# Patient Record
Sex: FEMALE | Race: WHITE | Employment: UNEMPLOYED | URBAN - METROPOLITAN AREA
[De-identification: names, ages, dates, MRNs, and addresses within clinical notes are randomized per-mention and may not be internally consistent; named-entity substitution may affect disease eponyms.]

---

## 2018-01-24 ENCOUNTER — HOSPITAL ENCOUNTER (OUTPATIENT)
Dept: GENERAL RADIOLOGY | Age: 63
Discharge: HOME | End: 2018-01-24
Attending: FAMILY MEDICINE
Payer: COMMERCIAL

## 2018-01-24 DIAGNOSIS — R05.3 CHRONIC COUGH: ICD-10-CM

## 2018-01-24 PROCEDURE — 71046 X-RAY EXAM CHEST 2 VIEWS: CPT

## 2018-01-24 NOTE — PROGRESS NOTES
Xray - no fluid (effusion), just has mild scarring as previous  Please call with results - she does not currently have access to [employer]

## 2018-01-31 ENCOUNTER — HOSPITAL ENCOUNTER (OUTPATIENT)
Dept: ULTRASOUND IMAGING | Age: 63
Discharge: HOME | End: 2018-01-31
Attending: FAMILY MEDICINE
Payer: COMMERCIAL

## 2018-01-31 DIAGNOSIS — M25.461 SWELLING OF BOTH KNEES: ICD-10-CM

## 2018-01-31 DIAGNOSIS — M25.462 SWELLING OF BOTH KNEES: ICD-10-CM

## 2018-01-31 PROCEDURE — 76882 US LMTD JT/FCL EVL NVASC XTR: CPT

## 2018-01-31 NOTE — PROGRESS NOTES
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 Paula Almanzar  Results released to [address] [place] [address] [address] with comments

## 2018-01-31 NOTE — PROGRESS NOTES
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 Matthew Thakkar  Results released to [address] [place] [address] [address] with comments

## 2018-03-08 ENCOUNTER — HOSPITAL ENCOUNTER (OUTPATIENT)
Dept: ULTRASOUND IMAGING | Age: 63
Discharge: HOME | End: 2018-03-08
Attending: FAMILY MEDICINE
Payer: COMMERCIAL

## 2018-03-08 DIAGNOSIS — R11.0 NAUSEA: ICD-10-CM

## 2018-03-08 PROCEDURE — 76700 US EXAM ABDOM COMPLETE: CPT

## 2018-03-08 NOTE — PROGRESS NOTES
Patient notifed of results.  She said she is going to try eating better and will call if she doesn't feel better

## 2018-03-08 NOTE — PROGRESS NOTES
Ultrasound ok  Call with lab results as well - patient does not appear to check mychart  Next step for evaluation would be referral to GI.   Let me know if she wishes to proceed

## 2018-03-28 PROBLEM — R26.9 ABNORMALITY OF GAIT AS LATE EFFECT OF STROKE: Status: ACTIVE | Noted: 2018-03-28

## 2018-03-28 PROBLEM — I69.398 ABNORMALITY OF GAIT AS LATE EFFECT OF STROKE: Status: ACTIVE | Noted: 2018-03-28

## 2018-11-26 ENCOUNTER — HOSPITAL ENCOUNTER (EMERGENCY)
Age: 63
Discharge: HOME | End: 2018-11-27
Attending: EMERGENCY MEDICINE
Payer: COMMERCIAL

## 2018-11-26 ENCOUNTER — APPOINTMENT (OUTPATIENT)
Dept: GENERAL RADIOLOGY | Age: 63
End: 2018-11-26
Attending: EMERGENCY MEDICINE
Payer: COMMERCIAL

## 2018-11-26 DIAGNOSIS — S52.532A CLOSED COLLES' FRACTURE OF LEFT RADIUS, INITIAL ENCOUNTER: Primary | ICD-10-CM

## 2018-11-26 PROCEDURE — 74011250637 HC RX REV CODE- 250/637: Performed by: EMERGENCY MEDICINE

## 2018-11-26 PROCEDURE — 73110 X-RAY EXAM OF WRIST: CPT

## 2018-11-26 PROCEDURE — 99283 EMERGENCY DEPT VISIT LOW MDM: CPT | Performed by: EMERGENCY MEDICINE

## 2018-11-26 RX ORDER — HYDROCODONE BITARTRATE AND ACETAMINOPHEN 7.5; 325 MG/1; MG/1
1 TABLET ORAL
Status: COMPLETED | OUTPATIENT
Start: 2018-11-26 | End: 2018-11-26

## 2018-11-26 RX ADMIN — HYDROCODONE BITARTRATE AND ACETAMINOPHEN 1 TABLET: 7.5; 325 TABLET ORAL at 23:46

## 2018-11-27 VITALS
DIASTOLIC BLOOD PRESSURE: 64 MMHG | WEIGHT: 133 LBS | RESPIRATION RATE: 20 BRPM | HEIGHT: 62 IN | HEART RATE: 95 BPM | TEMPERATURE: 98 F | SYSTOLIC BLOOD PRESSURE: 145 MMHG | BODY MASS INDEX: 24.48 KG/M2 | OXYGEN SATURATION: 98 %

## 2018-11-27 RX ORDER — HYDROCODONE BITARTRATE AND ACETAMINOPHEN 7.5; 325 MG/1; MG/1
1 TABLET ORAL
Qty: 17 TAB | Refills: 0 | Status: SHIPPED | OUTPATIENT
Start: 2018-11-27 | End: 2018-11-28

## 2018-11-27 NOTE — ED NOTES
I have reviewed discharge instructions with the patient. The patient verbalized understanding. Patient left ED via Discharge Method: ambulatory to Home with spouse. Opportunity for questions and clarification provided. Patient given 1 scripts. To continue your aftercare when you leave the hospital, you may receive an automated call from our care team to check in on how you are doing. This is a free service and part of our promise to provide the best care and service to meet your aftercare needs.  If you have questions, or wish to unsubscribe from this service please call [phone]. Thank you for Choosing our [facility] Emergency Department.

## 2018-11-27 NOTE — ED PROVIDER NOTES
Patient presents to ER after having a fall. Reports she fell from a standing position on her outstretched left hand and wrist.  Denies any head trauma or loss of consciousness. Reports pain to her left wrist and forearm. Denies any other injuries The history is provided by the patient. Wrist Pain This is a new problem. The current episode started 1 to 2 hours ago. The problem has not changed since onset. The pain is present in the left wrist and left arm. The quality of the pain is described as aching. The pain is at a severity of 4/10. The pain is mild. Associated symptoms include limited range of motion. Pertinent negatives include no numbness, no stiffness, no tingling and no back pain. She has tried nothing for the symptoms. Past Medical History:  
Diagnosis Date  Acromioclavicular joint arthritis   
 right  Anxiety Fluoxetine at 40mg made her feel like throat was closing up; no problems at 20mg but not effective  Autoimmune disease ([address]  Hypercholesteremia  Hypothyroidism  Insomnia  Lupus  Rheumatoid arthritis ([address]  RLS (restless legs syndrome)  Stroke [facility]) 2007  
 right cerebellar - residual balance problems and memory  Usual interstitial pneumonitis ([address] Past Surgical History:  
Procedure Laterality Date  HX BREAST AUGMENTATION  Feb 2002  HX BREAST AUGMENTATION  11/2016  
 revision  HX CARPAL TUNNEL RELEASE Bilateral   
 HX COLONOSCOPY  05/2015  
 normal - 5 year due to family  history  HX HEENT Left   
 graft eardrum x2 [facility] HEENT  Oct 2013  
 left ear repair - fracture bone  HX HEENT Left 5-21-15  
 ear reconstruction [address]  HX MOHS PROCEDURES Right   
 w/bicep tendon repair  HX ORTHOPAEDIC    
 trigger fingers [facility] ORTHOPAEDIC  Sept 2012  
 right shoulder and left wrist  
 HX ORTHOPAEDIC Right April 2015  
 wrist tendon repair  HX TONSILLECTOMY [facility] Family History:  
Problem Relation Age of Onset  Cancer Father  Heart Disease Mother Social History Socioeconomic History  Marital status:  Spouse name: Not on file  Number of children: Not on file  Years of education: Not on file  Highest education level: Not on file Social Needs  Financial resource strain: Not on file  Food insecurity - worry: Not on file  Food insecurity - inability: Not on file  Transportation needs - medical: Not on file  Transportation needs - non-medical: Not on file Occupational History  Not on file Tobacco Use  Smoking status: Former Smoker Packs/day: 1.50 Years: 9.00 Pack years: 13.50 Last attempt to quit: 1975 Years since quittin.9  Smokeless tobacco: Never Used Substance and Sexual Activity  Alcohol use: Yes Alcohol/week: 0.0 oz  
  Comment: social  
 Drug use: No  
 Sexual activity: Not on file Other Topics Concern  Not on file Social History Narrative  Not on file ALLERGIES: Codeine Review of Systems Constitutional: Negative for diaphoresis, fatigue and unexpected weight change. HENT: Negative for congestion and dental problem. Eyes: Negative for photophobia, redness and visual disturbance. Respiratory: Negative for choking and chest tightness. Cardiovascular: Negative for palpitations and leg swelling. Gastrointestinal: Negative for abdominal pain and anal bleeding. Endocrine: Negative for polyphagia. Genitourinary: Negative for flank pain, frequency and urgency. Musculoskeletal: Negative for back pain, gait problem and stiffness. Skin: Negative for pallor and rash. Neurological: Negative for tingling, syncope, speech difficulty and numbness. Hematological: Negative for adenopathy. Does not bruise/bleed easily. Psychiatric/Behavioral: Negative for behavioral problems and confusion. All other systems reviewed and are negative. Vitals:  
 11/26/18 2149 BP: 145/64 Pulse: 98 Resp: 20 Temp: 98 °F (36.7 °C) SpO2: 98% Weight: 60.3 kg (133 lb) Height: 5' 2\" (1.575 m) Physical Exam  
Constitutional: She appears well-developed and well-nourished. HENT:  
Head: Normocephalic and atraumatic. Cardiovascular: Normal rate. Pulmonary/Chest: Effort normal and breath sounds normal.  
Musculoskeletal:  
     Left shoulder: She exhibits normal range of motion and no tenderness. Left elbow: She exhibits normal range of motion and no swelling. Left wrist: She exhibits decreased range of motion, tenderness and bony tenderness. Neurological: She is alert. Nursing note and vitals reviewed. MDM Number of Diagnoses or Management Options Diagnosis management comments: We'll obtain x-ray of left wrist and forearm. Patient is neurovascularly intact distally 11:38 PM 
X-ray shows mildly displaced distal Colles' fracture and nondisplaced ulnar styloid Fracture Will place patient in a sugar tong, give sling, encouraged close follow-up with orthopedics Amount and/or Complexity of Data Reviewed Tests in the radiology section of CPT®: ordered and reviewed Risk of Complications, Morbidity, and/or Mortality Presenting problems: low Diagnostic procedures: low Management options: low Patient Progress Patient progress: stable Procedures

## 2018-11-27 NOTE — DISCHARGE INSTRUCTIONS
Take medications as prescribed  Keep arm elevated  Call and arrange follow-up with orthopedics  Returns to the ER for any new worsening symptoms    Broken Wrist: Care Instructions  Your Care Instructions    Your wrist can break, or fracture, during sports, a fall, or other accidents. The break may happen when your wrist is hit or is used to protect you in a fall. Fractures can range from a small, hairline crack, to a bone or bones broken into two or more pieces. Your treatment depends on how bad the break is. Your doctor may have put your wrist in a cast or splint. This will help keep your wrist stable until your follow-up appointment. It may take weeks or months for your wrist to heal. You can help it heal with care at home. You heal best when you take good care of yourself. Eat a variety of healthy foods, and don't smoke. Follow-up care is a key part of your treatment and safety. Be sure to make and go to all appointments, and call your doctor if you are having problems. It's also a good idea to know your test results and keep a list of the medicines you take. How can you care for yourself at home? · Put ice or a cold pack on your wrist for 10 to 20 minutes at a time. Try to do this every 1 to 2 hours for the next 3 days (when you are awake). Put a thin cloth between the ice and your cast or splint. Keep your cast or splint dry. · Follow the splint or cast care instructions your doctor gives you. If you have a splint, do not take it off unless your doctor tells you to. Be careful not to put the splint on too tight. · Be safe with medicines. Take pain medicines exactly as directed. ? If the doctor gave you a prescription medicine for pain, take it as prescribed. ? If you are not taking a prescription pain medicine, ask your doctor if you can take an over-the-counter medicine. · Prop up your wrist on pillows when you sit or lie down in the first few days after the injury.  Keep your wrist higher than the level of your heart. This will help reduce swelling. · Move your fingers often to reduce swelling and stiffness, but do not use that hand to grab or carry anything. · Follow instructions for exercises to keep your arm strong. When should you call for help? Call your doctor now or seek immediate medical care if:    · You have new or worse pain.     · Your hand or fingers are cool or pale or change color.     · Your cast or splint feels too tight.     · You have tingling, weakness, or numbness in your hand or fingers.    Watch closely for changes in your health, and be sure to contact your doctor if:    · You do not get better as expected.     · You have problems with your cast or splint. Where can you learn more? Go to http://narayan-alessandro.info/. Enter [id number] in the search box to learn more about \"Broken Wrist: Care Instructions. \"  Current as of: November 29, 2017  Content Version: 11.8  © 7784-8972 [employer]. Care instructions adapted under license by [employer] (which disclaims liability or warranty for this information). If you have questions about a medical condition or this instruction, always ask your healthcare professional. [address] any warranty or liability for your use of this information.

## 2018-11-30 RX ORDER — SODIUM CHLORIDE 0.9 % (FLUSH) 0.9 %
5-10 SYRINGE (ML) INJECTION EVERY 8 HOURS
Status: CANCELLED | OUTPATIENT
Start: 2018-11-30

## 2018-11-30 RX ORDER — SODIUM CHLORIDE 0.9 % (FLUSH) 0.9 %
5-10 SYRINGE (ML) INJECTION AS NEEDED
Status: CANCELLED | OUTPATIENT
Start: 2018-11-30

## 2018-12-03 ENCOUNTER — HOSPITAL ENCOUNTER (OUTPATIENT)
Age: 63
Setting detail: OUTPATIENT SURGERY
Discharge: HOME | End: 2018-12-03
Attending: ORTHOPAEDIC SURGERY | Admitting: ORTHOPAEDIC SURGERY
Payer: COMMERCIAL

## 2018-12-03 ENCOUNTER — APPOINTMENT (OUTPATIENT)
Dept: GENERAL RADIOLOGY | Age: 63
End: 2018-12-03
Attending: ORTHOPAEDIC SURGERY
Payer: COMMERCIAL

## 2018-12-03 ENCOUNTER — ANESTHESIA EVENT (OUTPATIENT)
Dept: SURGERY | Age: 63
End: 2018-12-03
Payer: COMMERCIAL

## 2018-12-03 ENCOUNTER — ANESTHESIA (OUTPATIENT)
Dept: SURGERY | Age: 63
End: 2018-12-03
Payer: COMMERCIAL

## 2018-12-03 VITALS
HEART RATE: 80 BPM | BODY MASS INDEX: 25.24 KG/M2 | RESPIRATION RATE: 16 BRPM | WEIGHT: 138 LBS | DIASTOLIC BLOOD PRESSURE: 67 MMHG | TEMPERATURE: 97.6 F | SYSTOLIC BLOOD PRESSURE: 118 MMHG | OXYGEN SATURATION: 98 %

## 2018-12-03 PROCEDURE — 77030039266 HC ADH SKN EXOFIN S2SG -A: Performed by: ORTHOPAEDIC SURGERY

## 2018-12-03 PROCEDURE — 77030000032 HC CUF TRNQT ZIMM -B: Performed by: ORTHOPAEDIC SURGERY

## 2018-12-03 PROCEDURE — 77030003602 HC NDL NRV BLK BBMI -B: Performed by: ANESTHESIOLOGY

## 2018-12-03 PROCEDURE — 76060000033 HC ANESTHESIA 1 TO 1.5 HR: Performed by: ORTHOPAEDIC SURGERY

## 2018-12-03 PROCEDURE — 77030002986 HC SUT PROL J&J -A: Performed by: ORTHOPAEDIC SURGERY

## 2018-12-03 PROCEDURE — C1713 ANCHOR/SCREW BN/BN,TIS/BN: HCPCS | Performed by: ORTHOPAEDIC SURGERY

## 2018-12-03 PROCEDURE — 77030033681 HC SPLNT P-CUT SAF BSNM -A: Performed by: ORTHOPAEDIC SURGERY

## 2018-12-03 PROCEDURE — 77030010509 HC AIRWY LMA MSK TELE -A: Performed by: ANESTHESIOLOGY

## 2018-12-03 PROCEDURE — 76942 ECHO GUIDE FOR BIOPSY: CPT | Performed by: ORTHOPAEDIC SURGERY

## 2018-12-03 PROCEDURE — 77030002933 HC SUT MCRYL J&J -A: Performed by: ORTHOPAEDIC SURGERY

## 2018-12-03 PROCEDURE — 74011250636 HC RX REV CODE- 250/636

## 2018-12-03 PROCEDURE — 77030008847 HC WRE K SYNT -A: Performed by: ORTHOPAEDIC SURGERY

## 2018-12-03 PROCEDURE — 74011250636 HC RX REV CODE- 250/636: Performed by: ORTHOPAEDIC SURGERY

## 2018-12-03 PROCEDURE — 76010010054 HC POST OP PAIN BLOCK: Performed by: ORTHOPAEDIC SURGERY

## 2018-12-03 PROCEDURE — 77030011884 HC TAPE CST PLSTR BSNM -A: Performed by: ORTHOPAEDIC SURGERY

## 2018-12-03 PROCEDURE — 77030003862 HC BIT DRL SYNT -B: Performed by: ORTHOPAEDIC SURGERY

## 2018-12-03 PROCEDURE — 76210000063 HC OR PH I REC FIRST 0.5 HR: Performed by: ORTHOPAEDIC SURGERY

## 2018-12-03 PROCEDURE — 74011250636 HC RX REV CODE- 250/636: Performed by: ANESTHESIOLOGY

## 2018-12-03 PROCEDURE — 77030018836 HC SOL IRR NACL ICUM -A: Performed by: ORTHOPAEDIC SURGERY

## 2018-12-03 PROCEDURE — 74011000250 HC RX REV CODE- 250

## 2018-12-03 PROCEDURE — 76010000161 HC OR TIME 1 TO 1.5 HR INTENSV-TIER 1: Performed by: ORTHOPAEDIC SURGERY

## 2018-12-03 PROCEDURE — 76210000020 HC REC RM PH II FIRST 0.5 HR: Performed by: ORTHOPAEDIC SURGERY

## 2018-12-03 PROCEDURE — 77030000031 HC BIT DRL QC SYNT -C: Performed by: ORTHOPAEDIC SURGERY

## 2018-12-03 DEVICE — SCREW BNE L12MM DIA2.4MM DST RAD VOLAR S STL ST VAR ANG LOK: Type: IMPLANTABLE DEVICE | Site: WRIST | Status: FUNCTIONAL

## 2018-12-03 DEVICE — 2.7MM CORTEX SCREW SLF-TPNG WITH T8 STARDRIVE RECESS 14MM: Type: IMPLANTABLE DEVICE | Site: WRIST | Status: FUNCTIONAL

## 2018-12-03 DEVICE — 2.4MM VA-LCP 2-CLMN VLR DSTL RADIUS PL 6H HD/3H SHAFT/LEFT
Type: IMPLANTABLE DEVICE | Site: WRIST | Status: FUNCTIONAL
Brand: VA-LCP

## 2018-12-03 DEVICE — 2.4MM VA LOCKING SCREW STARDRIVE 18MM: Type: IMPLANTABLE DEVICE | Site: WRIST | Status: FUNCTIONAL

## 2018-12-03 RX ORDER — CEFAZOLIN SODIUM/WATER 2 G/20 ML
2 SYRINGE (ML) INTRAVENOUS ONCE
Status: COMPLETED | OUTPATIENT
Start: 2018-12-03 | End: 2018-12-03

## 2018-12-03 RX ORDER — PROPOFOL 10 MG/ML
INJECTION, EMULSION INTRAVENOUS AS NEEDED
Status: DISCONTINUED | OUTPATIENT
Start: 2018-12-03 | End: 2018-12-03 | Stop reason: HOSPADM

## 2018-12-03 RX ORDER — MIDAZOLAM HYDROCHLORIDE 1 MG/ML
5 INJECTION, SOLUTION INTRAMUSCULAR; INTRAVENOUS ONCE
Status: COMPLETED | OUTPATIENT
Start: 2018-12-03 | End: 2018-12-03

## 2018-12-03 RX ORDER — HYDROCODONE BITARTRATE AND ACETAMINOPHEN 5; 325 MG/1; MG/1
2 TABLET ORAL AS NEEDED
Status: DISCONTINUED | OUTPATIENT
Start: 2018-12-03 | End: 2018-12-03 | Stop reason: HOSPADM

## 2018-12-03 RX ORDER — LIDOCAINE HYDROCHLORIDE 10 MG/ML
0.1 INJECTION INFILTRATION; PERINEURAL AS NEEDED
Status: DISCONTINUED | OUTPATIENT
Start: 2018-12-03 | End: 2018-12-03 | Stop reason: HOSPADM

## 2018-12-03 RX ORDER — ONDANSETRON 2 MG/ML
INJECTION INTRAMUSCULAR; INTRAVENOUS AS NEEDED
Status: DISCONTINUED | OUTPATIENT
Start: 2018-12-03 | End: 2018-12-03 | Stop reason: HOSPADM

## 2018-12-03 RX ORDER — FENTANYL CITRATE 50 UG/ML
100 INJECTION, SOLUTION INTRAMUSCULAR; INTRAVENOUS ONCE
Status: COMPLETED | OUTPATIENT
Start: 2018-12-03 | End: 2018-12-03

## 2018-12-03 RX ORDER — LIDOCAINE HYDROCHLORIDE 20 MG/ML
INJECTION, SOLUTION EPIDURAL; INFILTRATION; INTRACAUDAL; PERINEURAL AS NEEDED
Status: DISCONTINUED | OUTPATIENT
Start: 2018-12-03 | End: 2018-12-03 | Stop reason: HOSPADM

## 2018-12-03 RX ORDER — SODIUM CHLORIDE 0.9 % (FLUSH) 0.9 %
5-10 SYRINGE (ML) INJECTION AS NEEDED
Status: DISCONTINUED | OUTPATIENT
Start: 2018-12-03 | End: 2018-12-03 | Stop reason: HOSPADM

## 2018-12-03 RX ORDER — MIDAZOLAM HYDROCHLORIDE 1 MG/ML
2 INJECTION, SOLUTION INTRAMUSCULAR; INTRAVENOUS
Status: DISCONTINUED | OUTPATIENT
Start: 2018-12-03 | End: 2018-12-03 | Stop reason: HOSPADM

## 2018-12-03 RX ORDER — SODIUM CHLORIDE, SODIUM LACTATE, POTASSIUM CHLORIDE, CALCIUM CHLORIDE 600; 310; 30; 20 MG/100ML; MG/100ML; MG/100ML; MG/100ML
75 INJECTION, SOLUTION INTRAVENOUS CONTINUOUS
Status: DISCONTINUED | OUTPATIENT
Start: 2018-12-03 | End: 2018-12-03 | Stop reason: HOSPADM

## 2018-12-03 RX ORDER — DEXAMETHASONE SODIUM PHOSPHATE 4 MG/ML
INJECTION, SOLUTION INTRA-ARTICULAR; INTRALESIONAL; INTRAMUSCULAR; INTRAVENOUS; SOFT TISSUE AS NEEDED
Status: DISCONTINUED | OUTPATIENT
Start: 2018-12-03 | End: 2018-12-03 | Stop reason: HOSPADM

## 2018-12-03 RX ORDER — EPHEDRINE SULFATE 50 MG/ML
INJECTION, SOLUTION INTRAVENOUS AS NEEDED
Status: DISCONTINUED | OUTPATIENT
Start: 2018-12-03 | End: 2018-12-03 | Stop reason: HOSPADM

## 2018-12-03 RX ORDER — OXYCODONE HYDROCHLORIDE 5 MG/1
5 TABLET ORAL
Status: DISCONTINUED | OUTPATIENT
Start: 2018-12-03 | End: 2018-12-03 | Stop reason: HOSPADM

## 2018-12-03 RX ORDER — SODIUM CHLORIDE 0.9 % (FLUSH) 0.9 %
5-10 SYRINGE (ML) INJECTION EVERY 8 HOURS
Status: DISCONTINUED | OUTPATIENT
Start: 2018-12-03 | End: 2018-12-03 | Stop reason: HOSPADM

## 2018-12-03 RX ORDER — HYDROMORPHONE HYDROCHLORIDE 2 MG/ML
0.5 INJECTION, SOLUTION INTRAMUSCULAR; INTRAVENOUS; SUBCUTANEOUS
Status: DISCONTINUED | OUTPATIENT
Start: 2018-12-03 | End: 2018-12-03 | Stop reason: HOSPADM

## 2018-12-03 RX ADMIN — ONDANSETRON 4 MG: 2 INJECTION INTRAMUSCULAR; INTRAVENOUS at 10:54

## 2018-12-03 RX ADMIN — DEXAMETHASONE SODIUM PHOSPHATE 4 MG: 4 INJECTION, SOLUTION INTRA-ARTICULAR; INTRALESIONAL; INTRAMUSCULAR; INTRAVENOUS; SOFT TISSUE at 10:25

## 2018-12-03 RX ADMIN — Medication 2 G: at 10:12

## 2018-12-03 RX ADMIN — PROPOFOL 200 MG: 10 INJECTION, EMULSION INTRAVENOUS at 10:04

## 2018-12-03 RX ADMIN — EPHEDRINE SULFATE 5 MG: 50 INJECTION, SOLUTION INTRAVENOUS at 10:35

## 2018-12-03 RX ADMIN — MIDAZOLAM 5 MG: 1 INJECTION INTRAMUSCULAR; INTRAVENOUS at 08:44

## 2018-12-03 RX ADMIN — SODIUM CHLORIDE, SODIUM LACTATE, POTASSIUM CHLORIDE, AND CALCIUM CHLORIDE 75 ML/HR: 600; 310; 30; 20 INJECTION, SOLUTION INTRAVENOUS at 08:30

## 2018-12-03 RX ADMIN — LIDOCAINE HYDROCHLORIDE 40 MG: 20 INJECTION, SOLUTION EPIDURAL; INFILTRATION; INTRACAUDAL; PERINEURAL at 10:04

## 2018-12-03 RX ADMIN — FENTANYL CITRATE 50 MCG: 50 INJECTION INTRAMUSCULAR; INTRAVENOUS at 08:44

## 2018-12-03 NOTE — ANESTHESIA PREPROCEDURE EVALUATION
Anesthetic History No history of anesthetic complications Review of Systems / Medical History Patient summary reviewed and pertinent labs reviewed Pulmonary Within defined limits Neuro/Psych  
 
 
CVA (balance issues) Cardiovascular Exercise tolerance: >4 METS 
  
GI/Hepatic/Renal 
  
GERD: well controlled Endo/Other Hypothyroidism: well controlled Arthritis Other Findings Physical Exam 
 
Airway Mallampati: II 
TM Distance: 4 - 6 cm Neck ROM: normal range of motion Mouth opening: Normal 
 
 Cardiovascular Regular rate and rhythm,  S1 and S2 normal,  no murmur, click, rub, or gallop Dental 
 
 
  
Pulmonary Breath sounds clear to auscultation Abdominal 
GI exam deferred Other Findings Anesthetic Plan ASA: 2 Anesthesia type: general 
 
 
Post-op pain plan if not by surgeon: peripheral nerve block single Induction: Intravenous Anesthetic plan and risks discussed with: Patient

## 2018-12-03 NOTE — DISCHARGE INSTRUCTIONS
ACTIVITY  · As tolerated and as directed by your doctor. · Bathe or shower as directed by your doctor. DIET  · Clear liquids until no nausea or vomiting; then light diet for the first day. · Advance to regular diet on second day, unless your doctor orders otherwise. · If nausea and vomiting continues, call your doctor. PAIN  · Take pain medication as directed by your doctor. · Call your doctor if pain is NOT relieved by medication. · DO NOT take aspirin of blood thinners unless directed by your doctor. DRESSING CARE       CALL YOUR DOCTOR IF   · Excessive bleeding that does not stop after holding pressure over the area  · Temperature of 101 degrees F or above  · Excessive redness, swelling or bruising, and/ or green or yellow, smelly discharge from incision    AFTER ANESTHESIA   · For the first 24 hours: DO NOT Drive, Drink alcoholic beverages, or Make important decisions. · Be aware of dizziness following anesthesia and while taking pain medication. APPOINTMENT DATE/ TIME    YOUR DOCTOR'S PHONE NUMBER       DISCHARGE SUMMARY from Nurse    PATIENT INSTRUCTIONS:    After general anesthesia or intravenous sedation, for 24 hours or while taking prescription Narcotics:  · Limit your activities  · Do not drive and operate hazardous machinery  · Do not make important personal or business decisions  · Do  not drink alcoholic beverages  · If you have not urinated within 8 hours after discharge, please contact your surgeon on call. *  Please give a list of your current medications to your Primary Care Provider. *  Please update this list whenever your medications are discontinued, doses are      changed, or new medications (including over-the-counter products) are added. *  Please carry medication information at all times in case of emergency situations.       These are general instructions for a healthy lifestyle:    No smoking/ No tobacco products/ Avoid exposure to second hand smoke    Surgeon General's Warning:  Quitting smoking now greatly reduces serious risk to your health. Obesity, smoking, and sedentary lifestyle greatly increases your risk for illness    A healthy diet, regular physical exercise & weight monitoring are important for maintaining a healthy lifestyle    You may be retaining fluid if you have a history of heart failure or if you experience any of the following symptoms:  Weight gain of 3 pounds or more overnight or 5 pounds in a week, increased swelling in our hands or feet or shortness of breath while lying flat in bed. Please call your doctor as soon as you notice any of these symptoms; do not wait until your next office visit. Recognize signs and symptoms of STROKE:    F-face looks uneven    A-arms unable to move or move unevenly    S-speech slurred or non-existent    T-time-call 911 as soon as signs and symptoms begin-DO NOT go       Back to bed or wait to see if you get better-TIME IS BRAIN. Keep splint clean, dry and intact until seen in office. Move fingers, elevate, and ice to prevent swelling. No lifting.

## 2018-12-03 NOTE — ANESTHESIA POSTPROCEDURE EVALUATION
Procedure(s): LEFT DISTAL RADIUS OPEN REDUCTION INTERNAL FIXATION/. Anesthesia Post Evaluation Multimodal analgesia: multimodal analgesia not used between 6 hours prior to anesthesia start to PACU discharge Patient location during evaluation: bedside Patient participation: complete - patient participated Level of consciousness: awake and alert Pain score: 3 Pain management: adequate Airway patency: patent Anesthetic complications: no 
Cardiovascular status: acceptable and hemodynamically stable Respiratory status: acceptable Hydration status: acceptable Visit Vitals /67 Pulse 80 Temp 36.4 °C (97.6 °F) Resp 16 Wt 62.6 kg (138 lb) SpO2 98% BMI 25.24 kg/m²

## 2018-12-03 NOTE — ANESTHESIA PROCEDURE NOTES
Peripheral Block Start time: 12/3/2018 8:45 AM 
End time: 12/3/2018 8:50 AM 
Performed by: Kamala Jensen MD 
Authorized by: Kamala Jensen MD  
 
 
Pre-procedure: Indications: at surgeon's request, post-op pain management and procedure for pain Preanesthetic Checklist: patient identified, risks and benefits discussed, site marked, timeout performed, anesthesia consent given and patient being monitored Timeout Time: 08:44 Block Type:  
Block Type:  Supraclavicular Laterality:  Left Monitoring:  Standard ASA monitoring, responsive to questions, oxygen, continuous pulse ox, heart rate and frequent vital sign checks Injection Technique:  Single shot Procedures: ultrasound guided and nerve stimulator Patient Position: seated Prep: chlorhexidine Location:  Interscalene Needle Type:  Stimuplex Needle Gauge:  22 G Needle Localization:  Nerve stimulator and ultrasound guidance Motor Response: minimal motor response >0.4 mA Assessment: 
Number of attempts:  1 Injection Assessment:  Incremental injection every 5 mL, no paresthesia, ultrasound image on chart, local visualized surrounding nerve on ultrasound, negative aspiration for blood and no intravascular symptoms Patient tolerance:  Patient tolerated the procedure well with no immediate complications

## 2018-12-03 NOTE — BRIEF OP NOTE
BRIEF OPERATIVE NOTE Date of Procedure: 12/3/2018 Preoperative Diagnosis: Closed fracture of distal end of left radius, unspecified fracture morphology, initial encounter [S52.502A] Postoperative Diagnosis: Closed fracture of distal end of left radius, unspecified fracture morphology, initial encounter [S52.502A] Procedure(s): LEFT DISTAL RADIUS OPEN REDUCTION INTERNAL FIXATION/ 
Surgeon(s) and Role: Abdirashid Somers MD - Primary Surgical Assistant: FABIOLA Surgical Staff: 
Circ-1: Sandy Prater RN Radiology Technician: Nicky Hernandez, RT, R, CT Scrub Tech-1: Delilah Colon Event Time In Time Out Incision Start 21  Incision Close 1101 Anesthesia: Regional  
Estimated Blood Loss: MINIMAL Specimens: * No specimens in log * Findings: SEE DICTATION Complications: NONE Implants:  
Implant Name Type Inv. Item Serial No.  Lot No. LRB No. Used Action PLATE RAD DSTL 2-C 6H HD/3H LT -- VOLAR VA-LCP 2.4MM - [id number]  PLATE RAD DSTL 2-C 6H HD/3H LT -- VOLAR VA-LCP 2.4MM  [employer] [place] [id number] Left 1 Implanted SCR BNE CRTX ST T8 2.7X14MM SS --  - [id number]  SCR BNE CRTX ST T8 2.7X14MM SS --   [employer] [place] [id number] Left 1 Implanted SCR VA LCK ST STRDRV 2.4X18MM --  - [id number]  SCR VA LCK ST STRDRV 2.4X18MM --   [address] [id number] Left 5 Implanted SCR VA LCK ST STRDRV 2.4X12MM --  - [id number]  SCR VA LCK ST STRDRV 2.4X12MM --   [address] [id number] Left 2 Implanted

## 2018-12-12 RX ORDER — SODIUM CHLORIDE 0.9 % (FLUSH) 0.9 %
5-10 SYRINGE (ML) INJECTION EVERY 8 HOURS
Status: CANCELLED | OUTPATIENT
Start: 2018-12-12

## 2018-12-12 RX ORDER — CEFAZOLIN SODIUM/WATER 2 G/20 ML
2 SYRINGE (ML) INTRAVENOUS ONCE
Status: CANCELLED | OUTPATIENT
Start: 2018-12-12 | End: 2018-12-12

## 2018-12-12 RX ORDER — SODIUM CHLORIDE 0.9 % (FLUSH) 0.9 %
5-10 SYRINGE (ML) INJECTION AS NEEDED
Status: CANCELLED | OUTPATIENT
Start: 2018-12-12

## 2018-12-12 NOTE — OP NOTES
Operative Report       12/3/18    Preoperative diagnosis:  Closed fracture of distal end of left radius, unspecified fracture morphology, initial encounter [S52.502A]    Postoperative diagnosis: Closed fracture of distal end of left radius, unspecified fracture morphology, initial encounter [S52.502A] 3 part intra articular    Surgeon(s) and Role:     * Ron Queen MD - Primary     Anesthesia: Regional Local with MAC. Procedures: Procedure(s):  LEFT DISTAL RADIUS OPEN REDUCTION INTERNAL FIXATION/   3 part intra articular fx    EBL/IV FLUIDS: Per Anesthesia. COMPLICATIONS: None. DISPOSITION: Stable to recovery room. INDICATIONS FOR PROCEDURE: The patient is a pleasant 59year-old female with displaced left distal radius fracture that has failed nonoperative measures. After both operative and nonoperative treatment options were discussed, the decision was made to go ahead with a left distal radius ORIF. Risks and benefits of the procedure were discussed including but not limited to bleeding, infection, injury to adjacent structures , consisting of tendon, artery or nerve, need for additional procedures, wound dehiscence, scar formation, incomplete resolution of symptoms, recurrence of symptoms, transient neurapraxia, decreased range of motion, hypersensitivity, recurrence of deformity, pin tract infection, malunion, nonunion, failure of hardware, need for removal of hardware, irritation of tendon, artery, decreased range of motion, stiffness, pain, as well as anesthetic risk. Informed consent was obtained. PROCEDURE IN DETAIL: The patient was seen and marked in the preoperative suite. The patient was taken back to the OR, placed on the table in supine position with left upper extremities on hand tables. Left upper extremities were prepped and draped in standard sterile fashion.  A formal timeout was performed confirming patient identification, preoperative antibiotics, and planned operative procedure. We exsanguinated the left upper extremity and tourniquet was placed to 250 mmHg. A standard FCR incision was made dissecting down through the FCR bed and retracting the FPL. We incised the pronator and standard L fashion. This was an intra-articular greater than 3 parts. We were able to use traction and reduce the intra-articular segments as well as use of volar buttress plate to reduce the fracture and position. We utilize an synthes variable angle plate. We sequentially and drilled so that our screws were subchondral supporting the fragments. Final radiographs show adequate reduction of the fracture and placement of the hardware. We repaired the Z-lengthening brachial radialis release. We irrigated copiously with normal saline and closed with Monocryl and subcuticular Prolene and Dermabond glue. The DRUJ was stable. Tourniquet was let down, soft sterile dressing was placed and the fingers pinked up nicely. Patient was taken to the recovery room having told procedure well. POSTOPERATIVE CARE: Early motion. No heavy lifting.  Followup in 2 weeks for suture removal.    Closure: Primary    Complications: None     Signed By: Altaf Marrero MD

## 2018-12-21 ENCOUNTER — ANESTHESIA EVENT (OUTPATIENT)
Dept: SURGERY | Age: 63
End: 2018-12-21
Payer: COMMERCIAL

## 2018-12-26 ENCOUNTER — HOSPITAL ENCOUNTER (OUTPATIENT)
Age: 63
Setting detail: OUTPATIENT SURGERY
Discharge: HOME | End: 2018-12-26
Attending: ORTHOPAEDIC SURGERY | Admitting: ORTHOPAEDIC SURGERY
Payer: COMMERCIAL

## 2018-12-26 ENCOUNTER — ANESTHESIA (OUTPATIENT)
Dept: SURGERY | Age: 63
End: 2018-12-26
Payer: COMMERCIAL

## 2018-12-26 VITALS
DIASTOLIC BLOOD PRESSURE: 89 MMHG | SYSTOLIC BLOOD PRESSURE: 122 MMHG | OXYGEN SATURATION: 97 % | TEMPERATURE: 97.1 F | HEART RATE: 17 BPM | BODY MASS INDEX: 24.69 KG/M2 | RESPIRATION RATE: 14 BRPM | WEIGHT: 135 LBS

## 2018-12-26 PROCEDURE — 77030018673: Performed by: ORTHOPAEDIC SURGERY

## 2018-12-26 PROCEDURE — 74011250636 HC RX REV CODE- 250/636: Performed by: ANESTHESIOLOGY

## 2018-12-26 PROCEDURE — C1713 ANCHOR/SCREW BN/BN,TIS/BN: HCPCS | Performed by: ORTHOPAEDIC SURGERY

## 2018-12-26 PROCEDURE — 77030004453 HC BUR SHV STRY -B: Performed by: ORTHOPAEDIC SURGERY

## 2018-12-26 PROCEDURE — 77030034139 HC NDL ENDOSC TRUPASS SGL S&N -C: Performed by: ORTHOPAEDIC SURGERY

## 2018-12-26 PROCEDURE — 76210000063 HC OR PH I REC FIRST 0.5 HR: Performed by: ORTHOPAEDIC SURGERY

## 2018-12-26 PROCEDURE — 74011250636 HC RX REV CODE- 250/636: Performed by: ORTHOPAEDIC SURGERY

## 2018-12-26 PROCEDURE — 77030018836 HC SOL IRR NACL ICUM -A: Performed by: ORTHOPAEDIC SURGERY

## 2018-12-26 PROCEDURE — 77030002913 HC SUT ETHBND J&J -B: Performed by: ORTHOPAEDIC SURGERY

## 2018-12-26 PROCEDURE — 76010010054 HC POST OP PAIN BLOCK: Performed by: ORTHOPAEDIC SURGERY

## 2018-12-26 PROCEDURE — 74011250636 HC RX REV CODE- 250/636

## 2018-12-26 PROCEDURE — 74011000250 HC RX REV CODE- 250: Performed by: ORTHOPAEDIC SURGERY

## 2018-12-26 PROCEDURE — 77030010509 HC AIRWY LMA MSK TELE -A: Performed by: ANESTHESIOLOGY

## 2018-12-26 PROCEDURE — 77030033138 HC SUT PGA STRATFX J&J -B: Performed by: ORTHOPAEDIC SURGERY

## 2018-12-26 PROCEDURE — 77030019605: Performed by: ORTHOPAEDIC SURGERY

## 2018-12-26 PROCEDURE — 76060000035 HC ANESTHESIA 2 TO 2.5 HR: Performed by: ORTHOPAEDIC SURGERY

## 2018-12-26 PROCEDURE — 77030013367: Performed by: ORTHOPAEDIC SURGERY

## 2018-12-26 PROCEDURE — 77030006891 HC BLD SHV RESECT STRY -B: Performed by: ORTHOPAEDIC SURGERY

## 2018-12-26 PROCEDURE — 77030002933 HC SUT MCRYL J&J -A: Performed by: ORTHOPAEDIC SURGERY

## 2018-12-26 PROCEDURE — 76210000021 HC REC RM PH II 0.5 TO 1 HR: Performed by: ORTHOPAEDIC SURGERY

## 2018-12-26 PROCEDURE — 77030002916 HC SUT ETHLN J&J -A: Performed by: ORTHOPAEDIC SURGERY

## 2018-12-26 PROCEDURE — 76942 ECHO GUIDE FOR BIOPSY: CPT | Performed by: ORTHOPAEDIC SURGERY

## 2018-12-26 PROCEDURE — 74011000250 HC RX REV CODE- 250

## 2018-12-26 PROCEDURE — 77030033005 HC TBNG ARTHSC PMP STRY -B: Performed by: ORTHOPAEDIC SURGERY

## 2018-12-26 PROCEDURE — 77030032490 HC SLV COMPR SCD KNE COVD -B: Performed by: ORTHOPAEDIC SURGERY

## 2018-12-26 PROCEDURE — 77030033073 HC TBNG ARTHSC PMP OUTFLO STRY -B: Performed by: ORTHOPAEDIC SURGERY

## 2018-12-26 PROCEDURE — 77030035258: Performed by: ORTHOPAEDIC SURGERY

## 2018-12-26 PROCEDURE — 76010000171 HC OR TIME 2 TO 2.5 HR INTENSV-TIER 1: Performed by: ORTHOPAEDIC SURGERY

## 2018-12-26 PROCEDURE — 77030003666 HC NDL SPINAL BD -A: Performed by: ORTHOPAEDIC SURGERY

## 2018-12-26 PROCEDURE — 77030016370 HC SUT ULTBRD S&N -B: Performed by: ORTHOPAEDIC SURGERY

## 2018-12-26 PROCEDURE — 77030003602 HC NDL NRV BLK BBMI -B: Performed by: ANESTHESIOLOGY

## 2018-12-26 PROCEDURE — 77030002966 HC SUT PDS J&J -A: Performed by: ORTHOPAEDIC SURGERY

## 2018-12-26 PROCEDURE — 77030027384 HC PRB ARTHSCP SERFAS STRY -C: Performed by: ORTHOPAEDIC SURGERY

## 2018-12-26 DEVICE — HEALICOIL PK 5.5 MM SUTURE ANCHOR                                    WITH ONE ULTRATAPE SUTURE BLUE AND                                    ONE ULTRABRAID SUTURE NO.2
Type: IMPLANTABLE DEVICE | Site: SHOULDER | Status: FUNCTIONAL
Brand: HEALICOIL

## 2018-12-26 DEVICE — ICONIX 2 NEEDLES WITH INTELLIBRAID TECHNOLOGY, 2.3MM ANCHOR WITH 2 STRANDS #2 FORCE FIBER
Type: IMPLANTABLE DEVICE | Site: SHOULDER | Status: FUNCTIONAL
Brand: ICONIX

## 2018-12-26 DEVICE — HEALICOIL PK 5.5 MM SUTURE ANCHOR                                    WITH THREE ULTRABRAID NO.2 SUTURES                                    BLUE, BLUE-COBRAID, COBRAID-BLACK STERILE
Type: IMPLANTABLE DEVICE | Site: SHOULDER | Status: FUNCTIONAL
Brand: HEALICOIL

## 2018-12-26 DEVICE — HEALICOIL PK 4.5 MM SUTURE ANCHOR                                    WITH TWO ULTRABRAID NO.2 SUTURES                                    BLUE, BLUE-COBRAID STERILE
Type: IMPLANTABLE DEVICE | Site: SHOULDER | Status: FUNCTIONAL
Brand: HEALICOIL

## 2018-12-26 DEVICE — MULTIFIX S-ULTRA 5.5MM KNOTLESS ANCHOR
Type: IMPLANTABLE DEVICE | Site: SHOULDER | Status: FUNCTIONAL
Brand: MULTIFIX

## 2018-12-26 RX ORDER — DIPHENHYDRAMINE HYDROCHLORIDE 50 MG/ML
12.5 INJECTION, SOLUTION INTRAMUSCULAR; INTRAVENOUS ONCE
Status: DISCONTINUED | OUTPATIENT
Start: 2018-12-26 | End: 2018-12-26 | Stop reason: HOSPADM

## 2018-12-26 RX ORDER — LIDOCAINE HYDROCHLORIDE AND EPINEPHRINE 5; 5 MG/ML; UG/ML
INJECTION, SOLUTION INFILTRATION; PERINEURAL AS NEEDED
Status: DISCONTINUED | OUTPATIENT
Start: 2018-12-26 | End: 2018-12-26 | Stop reason: HOSPADM

## 2018-12-26 RX ORDER — NALOXONE HYDROCHLORIDE 0.4 MG/ML
0.1 INJECTION, SOLUTION INTRAMUSCULAR; INTRAVENOUS; SUBCUTANEOUS AS NEEDED
Status: DISCONTINUED | OUTPATIENT
Start: 2018-12-26 | End: 2018-12-26 | Stop reason: HOSPADM

## 2018-12-26 RX ORDER — SODIUM CHLORIDE 0.9 % (FLUSH) 0.9 %
5-10 SYRINGE (ML) INJECTION AS NEEDED
Status: DISCONTINUED | OUTPATIENT
Start: 2018-12-26 | End: 2018-12-26 | Stop reason: HOSPADM

## 2018-12-26 RX ORDER — OXYCODONE HYDROCHLORIDE 5 MG/1
5 TABLET ORAL
Status: DISCONTINUED | OUTPATIENT
Start: 2018-12-26 | End: 2018-12-26 | Stop reason: HOSPADM

## 2018-12-26 RX ORDER — PROPOFOL 10 MG/ML
INJECTION, EMULSION INTRAVENOUS AS NEEDED
Status: DISCONTINUED | OUTPATIENT
Start: 2018-12-26 | End: 2018-12-26 | Stop reason: HOSPADM

## 2018-12-26 RX ORDER — LIDOCAINE HYDROCHLORIDE 10 MG/ML
0.1 INJECTION INFILTRATION; PERINEURAL AS NEEDED
Status: DISCONTINUED | OUTPATIENT
Start: 2018-12-26 | End: 2018-12-26 | Stop reason: HOSPADM

## 2018-12-26 RX ORDER — OXYCODONE HYDROCHLORIDE 5 MG/1
10 TABLET ORAL
Status: DISCONTINUED | OUTPATIENT
Start: 2018-12-26 | End: 2018-12-26 | Stop reason: HOSPADM

## 2018-12-26 RX ORDER — SODIUM CHLORIDE, SODIUM LACTATE, POTASSIUM CHLORIDE, CALCIUM CHLORIDE 600; 310; 30; 20 MG/100ML; MG/100ML; MG/100ML; MG/100ML
75 INJECTION, SOLUTION INTRAVENOUS CONTINUOUS
Status: DISCONTINUED | OUTPATIENT
Start: 2018-12-26 | End: 2018-12-26 | Stop reason: HOSPADM

## 2018-12-26 RX ORDER — EPINEPHRINE 1 MG/ML
INJECTION INTRAMUSCULAR; INTRAVENOUS; SUBCUTANEOUS AS NEEDED
Status: DISCONTINUED | OUTPATIENT
Start: 2018-12-26 | End: 2018-12-26 | Stop reason: HOSPADM

## 2018-12-26 RX ORDER — ONDANSETRON 2 MG/ML
4 INJECTION INTRAMUSCULAR; INTRAVENOUS ONCE
Status: DISCONTINUED | OUTPATIENT
Start: 2018-12-26 | End: 2018-12-26 | Stop reason: HOSPADM

## 2018-12-26 RX ORDER — ROPIVACAINE HYDROCHLORIDE 5 MG/ML
INJECTION, SOLUTION EPIDURAL; INFILTRATION; PERINEURAL
Status: COMPLETED | OUTPATIENT
Start: 2018-12-26 | End: 2018-12-26

## 2018-12-26 RX ORDER — ACETAMINOPHEN 500 MG
500 TABLET ORAL ONCE
Status: DISCONTINUED | OUTPATIENT
Start: 2018-12-26 | End: 2018-12-26 | Stop reason: HOSPADM

## 2018-12-26 RX ORDER — MIDAZOLAM HYDROCHLORIDE 1 MG/ML
2 INJECTION, SOLUTION INTRAMUSCULAR; INTRAVENOUS
Status: COMPLETED | OUTPATIENT
Start: 2018-12-26 | End: 2018-12-26

## 2018-12-26 RX ORDER — FENTANYL CITRATE 50 UG/ML
100 INJECTION, SOLUTION INTRAMUSCULAR; INTRAVENOUS AS NEEDED
Status: DISCONTINUED | OUTPATIENT
Start: 2018-12-26 | End: 2018-12-26 | Stop reason: HOSPADM

## 2018-12-26 RX ORDER — SODIUM CHLORIDE 0.9 % (FLUSH) 0.9 %
5-10 SYRINGE (ML) INJECTION EVERY 8 HOURS
Status: DISCONTINUED | OUTPATIENT
Start: 2018-12-26 | End: 2018-12-26 | Stop reason: HOSPADM

## 2018-12-26 RX ORDER — ONDANSETRON 2 MG/ML
INJECTION INTRAMUSCULAR; INTRAVENOUS AS NEEDED
Status: DISCONTINUED | OUTPATIENT
Start: 2018-12-26 | End: 2018-12-26 | Stop reason: HOSPADM

## 2018-12-26 RX ORDER — LIDOCAINE HYDROCHLORIDE 20 MG/ML
INJECTION, SOLUTION EPIDURAL; INFILTRATION; INTRACAUDAL; PERINEURAL AS NEEDED
Status: DISCONTINUED | OUTPATIENT
Start: 2018-12-26 | End: 2018-12-26 | Stop reason: HOSPADM

## 2018-12-26 RX ORDER — HYDROMORPHONE HYDROCHLORIDE 2 MG/ML
0.5 INJECTION, SOLUTION INTRAMUSCULAR; INTRAVENOUS; SUBCUTANEOUS
Status: DISCONTINUED | OUTPATIENT
Start: 2018-12-26 | End: 2018-12-26 | Stop reason: HOSPADM

## 2018-12-26 RX ORDER — SODIUM CHLORIDE, SODIUM LACTATE, POTASSIUM CHLORIDE, CALCIUM CHLORIDE 600; 310; 30; 20 MG/100ML; MG/100ML; MG/100ML; MG/100ML
1000 INJECTION, SOLUTION INTRAVENOUS CONTINUOUS
Status: DISCONTINUED | OUTPATIENT
Start: 2018-12-26 | End: 2018-12-26 | Stop reason: HOSPADM

## 2018-12-26 RX ORDER — DEXAMETHASONE SODIUM PHOSPHATE 4 MG/ML
INJECTION, SOLUTION INTRA-ARTICULAR; INTRALESIONAL; INTRAMUSCULAR; INTRAVENOUS; SOFT TISSUE AS NEEDED
Status: DISCONTINUED | OUTPATIENT
Start: 2018-12-26 | End: 2018-12-26 | Stop reason: HOSPADM

## 2018-12-26 RX ORDER — CEFAZOLIN SODIUM/WATER 2 G/20 ML
2 SYRINGE (ML) INTRAVENOUS ONCE
Status: COMPLETED | OUTPATIENT
Start: 2018-12-26 | End: 2018-12-26

## 2018-12-26 RX ADMIN — FENTANYL CITRATE 100 MCG: 50 INJECTION INTRAMUSCULAR; INTRAVENOUS at 11:43

## 2018-12-26 RX ADMIN — ONDANSETRON 4 MG: 2 INJECTION INTRAMUSCULAR; INTRAVENOUS at 13:42

## 2018-12-26 RX ADMIN — SODIUM CHLORIDE, SODIUM LACTATE, POTASSIUM CHLORIDE, AND CALCIUM CHLORIDE 1000 ML: 600; 310; 30; 20 INJECTION, SOLUTION INTRAVENOUS at 11:30

## 2018-12-26 RX ADMIN — SODIUM CHLORIDE, SODIUM LACTATE, POTASSIUM CHLORIDE, AND CALCIUM CHLORIDE: 600; 310; 30; 20 INJECTION, SOLUTION INTRAVENOUS at 15:14

## 2018-12-26 RX ADMIN — DEXAMETHASONE SODIUM PHOSPHATE 8 MG: 4 INJECTION, SOLUTION INTRA-ARTICULAR; INTRALESIONAL; INTRAMUSCULAR; INTRAVENOUS; SOFT TISSUE at 13:42

## 2018-12-26 RX ADMIN — Medication 2 G: at 13:35

## 2018-12-26 RX ADMIN — LIDOCAINE HYDROCHLORIDE 60 MG: 20 INJECTION, SOLUTION EPIDURAL; INFILTRATION; INTRACAUDAL; PERINEURAL at 13:24

## 2018-12-26 RX ADMIN — MIDAZOLAM HYDROCHLORIDE 2 MG: 2 INJECTION, SOLUTION INTRAMUSCULAR; INTRAVENOUS at 11:43

## 2018-12-26 RX ADMIN — SODIUM CHLORIDE, SODIUM LACTATE, POTASSIUM CHLORIDE, AND CALCIUM CHLORIDE: 600; 310; 30; 20 INJECTION, SOLUTION INTRAVENOUS at 13:20

## 2018-12-26 RX ADMIN — ROPIVACAINE HYDROCHLORIDE 30 ML: 5 INJECTION, SOLUTION EPIDURAL; INFILTRATION; PERINEURAL at 11:49

## 2018-12-26 RX ADMIN — PROPOFOL 200 MG: 10 INJECTION, EMULSION INTRAVENOUS at 13:24

## 2018-12-26 NOTE — DISCHARGE INSTRUCTIONS
Rotator Cuff Repair Postoperative Instructions    Returning Home  1. Your pain after surgery will vary depending on the method of anesthesia used and from patient to patient. In the first 24 hours, pain medication should be taken regularly with small amounts of food. During this time, nausea and light-headedness are common and should improve in 2-5 days. Drinking fluids may help. If nausea persists, medicine can be prescribed by calling your doctor at [phone]. Leaving the Outpatient Surgery Center:  As you leave the surgery center, you will be in a sling and swath. The sling will have a pillow with it holding your arm away from your body slightly. Plan on wearing the sling for 4-6 weeks after surgery. Make sure that you have a large shirt or a button up shirt to wear home. For the first week:  1. Sleeping and resting will be more comfortable if you are propped up in bed or have access to a recliner. 2. Ice your shoulder to help manage the pain. 20 minutes of ice every hour as needed. 3. You may come out of the sling 3-5x/day to do elbow, wrist and hand range of motion, and other home exercises (listed further down in - Home Excersizes) so your other joints do not get stiff. Just do not activate or use your shoulder muscles! 4. Sleep with your sling on. Sling Use  You will be in the sling for 4 to 6 weeks. You may take the sling off to do your home exercises or physical therapy, or shower, but you need to wear the sling at all other times, even SLEEPING. To put the sling on:    1. Make sure the pillow of the sling is snug against your side and that your hand and elbow are parallel to the floor. 2. One strap will go around your waist and connect to the pillow. 3. The other strap is one up front and two in back. The part that says [employer] will go by the neck and the other padded part will go under your arm of the non-operated side. Care of Your Incisions  1.  Incisions and stitches are often checked/removed 6 to 10 days after surgery. 2. Moderate bleeding may occur at the incision sites. This should decrease quickly over time. 3. Leave the dressings from surgery in place for 48 to 72 hours. The bulky dressings may be removed and replaced with fresh gauze at that time, but leave on the small tape strips on the incision sites. 4. Watch the wound for increasing redness, tenderness, swelling, and pus drainage daily. These can be early signs of infection. If you notice any of these signs of infection please call [phone]. A mild fever during the first few days after surgery is not uncommon. This often occurs and can be treated with deep breathing, coughing to clear the lungs, and walking. However, fevers, increasing pain, and swelling at the incisions should be reported immediately. Showering:   Until your sutures are removed, you should consider covering your shoulder in saran wrap with tape for showering.  A plastic chair in your shower will allow you to sit.  Sponge bathing is also an option.  In general, after your sutures have been removed you may allow your incisions to get wet in the shower. Post-Operative Pain Management  ANESTHESIA: You will meet with an anesthesiologist on the day of surgery to discuss your anesthesia. You will have general anesthesia and often will have a nerve block. MEDICATIONS: You will be given a prescription for medications. Please take them as directed on the label and with food.  Certain pain medications may contain Tylenol(Acetaminophen). It is important not to take any additional Tylenol while on these pain medications.  Do not mix your pain medications with alcohol.  You should not drive while taking pain medications as they increase your liability and delay your responses.  Your physician will most likely talk with you about taking Aspirin 81 mg or 325 mg (ECASA) once daily for two-three weeks after surgery.  This is done in order to help minimize the risk for a blood clot from developing, which is a possible complication after any surgery. If you have Ulcers or stomach irritation do not take this medicine. Be careful taking aspirin and other NSAID's as it can increase your risk of gastric irritation and other side effects.  If you have any questions or concerns regarding your medications, please call the office. · Common side effects of the narcotics include nausea, vomiting, drowsiness, constipation, and difficulty urinating. If you experience constipation, drink lots of water/Gatorade, avoid soda and diet drinks. Eat plenty of fiber. You may take a stool softener: Colace 100mg twice a day for the first week. For severe constipation use magnesium citrate, one 8 oz bottle. All can be bought at the pharmacy. Diet and General Conditioning  Aerobic conditioning and diet are both very important after surgery. In general, we recommend that you make sure to avoid skipping meals, eat a balanced diet including regular portions of fruits and vegetables, and avoid relying on fast foods while you are recovering from surgery. Also, consider taking a daily multi-vitamin. Participate in some form of aerobic conditioning after surgery. Speak with your physical therapist or call our office to determine an appropriate form of exercise after surgery. Initially, your exercise will need to be modified after surgery. Follow Up Visits  Doctor  Plan on seeing your surgeon at 1 week, 1 month, 3 months, and 6 months after surgery. If your shoulder does not progress as planned, you are welcome to schedule additional visits. There is usually no charge for surgery related visits 90 days following surgery. You will receive a bill for any x-rays or special equipment (such as a brace). Home Exercises  Do these exercises 3-5 times per day after surgery    1. Elbow, Wrist, and Hand Motion- have someone help you remove your sling.  Let your arm dangle at your side. Using your other hand to help, bend and straighten your elbow. Also, turn your hand back and forth and move your fingers, wrist, and hand. Do this for 3-5 minutes each time that you do it. 2. Pendulums- Have someone help you remove your sling. Let your arm dangle straight at your side. Brace yourself with your non-operated arm on a table or chair. Lean forward slightly and relax your shoulder. Gravity will pull your arm away from your body. Let your arm hang and when you feel comfortable use your body to slightly swing your arm. Slowly stand up. Do this 3 times for up to 1 minute each time. DO NOT ACTIVATE YOUR SHOULDER MUSCLES. 3. Scapular Squeezes- You may leave your sling on for this exercise. Sit up straight. Roll your shoulders back and squeeze your shoulder blades together. Do this 30 times for each set of exercises that you do. 4. Scapular Shrugs- You may leave your sling on for this exercise as well. Sit up straight and do a scapular squeeze, while you hold the squeeze, shrug your shoulders. Do this 15 times for each set of exercises that you do throughout the day. [employer]                 Phone [phone]  [address]                 Fax [phone]                             [address]    TYPICAL SIDE EFFECTS OF PAIN MEDICATION:  *    Constipation: Drink lots of fluids. Over the counter stool softener if needed. *    Nausea: Take pain medication with food. Call your doctor with persistent nausea. ACTIVITY  · As tolerated and as directed by your doctor. · Bathe or shower as directed by your doctor. DIET  · Day of surgery: Clear liquids until no nausea or vomiting; small portion, light diet [employer] foods (ex: baked chicken, plain rice, grits, scrambled eggs, toast). Nothing greasy, fried or spicy today. · Advance to regular diet on second day, unless your doctor orders otherwise.    · If nausea and vomiting continues, call your doctor. PAIN  · Take pain medication as directed by your doctor. · DO NOT take aspirin or blood thinners unless directed by your doctor. CALL YOUR DOCTOR IF    s Call your doctor if pain is NOT relieved by medication.   s Excessive bleeding that does not stop after holding pressure over the area  · Temperature of 101 degrees F or above  · Excessive redness, swelling or bruising, and/ or green or yellow, smelly discharge from incision    AFTER ANESTHESIA   · For the first 24 hours: DO NOT Drive, Drink alcoholic beverages, or Make important decisions. · Be aware of dizziness following anesthesia and while taking pain medication. DISCHARGE SUMMARY from Nurse    PATIENT INSTRUCTIONS:    After general anesthesia or intravenous sedation, for 24 hours or while taking prescription Narcotics:  · Limit your activities  · Do not drive and operate hazardous machinery  · Do not make important personal or business decisions  · Do  not drink alcoholic beverages  · If you have not urinated within 8 hours after discharge, please contact your surgeon on call. *  Please give a list of your current medications to your Primary Care Provider. *  Please update this list whenever your medications are discontinued, doses are      changed, or new medications (including over-the-counter products) are added. *  Please carry medication information at all times in case of emergency situations. Preventing Infection at Home  We care about preventing infection and avoiding the spread of germs - not only when you are in the hospital but also when you return home. When you return home from the hospital, its important to take the following steps to help prevent infection and avoid spreading germs that could infect you and others. Ask everyone in your home to follow these guidelines, too.     Clean Your Hands  · Clean your hands whenever your hands are visibly dirty, before you eat, before or after touching your mouth, nose or eyes, and before preparing food. Clean them after contact with body fluids, using the restroom, touching animals or changing diapers. · When washing hands, wet them with warm water and work up a lather. Rub hands for at least 15 seconds, then rinse them and pat them dry with a clean towel or paper towel. · When using hand sanitizers, it should take about 15 seconds to rub your hands dry. If not, you probably didnt apply enough . Cover Your Sneeze or Cough  Germs are released into the air whenever you sneeze or cough. To prevent the spread of infection:  · Turn away from other people before coughing or sneezing. · Cover your mouth or nose with a tissue when you cough or sneeze. Put the tissue in the trash. · If you dont have a tissue, cough or sneeze into your upper sleeve, not your hands. · Always clean your hands after coughing or sneezing. Care for Wounds  Your skin is your bodys first line of defense against germs, but an open wound leaves an easy way for germs to enter your body. To prevent infection:  · Clean your hands before and after changing wound dressings, and wear gloves to change dressings if recommended by your doctor. · Take special care with IV lines or other devices inserted into the body. If you must touch them, clean your hands first.  · Follow any specific instructions from your doctor to care for your wounds. Contact your doctor if you experience any signs of infection, such as fever or increased redness at the surgical or wound site. Keep a Clean Home  · Clean or wipe commonly touched hard surfaces like door handles, sinks, tabletops, phones and TV remotes. · Use products labeled disinfectant to kill harmful bacteria and viruses. · Use a clean cloth or paper towel to clean and dry surfaces. Wiping surfaces with a dirty dishcloth, sponge or towel will only spread germs.   · Never share toothbrushes, reed, drinking glasses, utensils, razor blades, face cloths or bath towels to avoid spreading germs. · Be sure that the linens that you sleep on are clean. · Keep pets away from wounds and wash your hands after touching pets, their toys or bedding. We care about you and your health. Remember, preventing infections is a team effort between you, your family, friends and health care providers. These are general instructions for a healthy lifestyle:    No smoking/ No tobacco products/ Avoid exposure to second hand smoke    Surgeon General's Warning:  Quitting smoking now greatly reduces serious risk to your health. Obesity, smoking, and sedentary lifestyle greatly increases your risk for illness    A healthy diet, regular physical exercise & weight monitoring are important for maintaining a healthy lifestyle    You may be retaining fluid if you have a history of heart failure or if you experience any of the following symptoms:  Weight gain of 3 pounds or more overnight or 5 pounds in a week, increased swelling in our hands or feet or shortness of breath while lying flat in bed. Please call your doctor as soon as you notice any of these symptoms; do not wait until your next office visit. Recognize signs and symptoms of STROKE:    F-face looks uneven    A-arms unable to move or move unevenly    S-speech slurred or non-existent    T-time-call 911 as soon as signs and symptoms begin-DO NOT go       Back to bed or wait to see if you get better-TIME IS BRAIN.

## 2018-12-26 NOTE — OP NOTES
Operative Note    12/26/2018     Preoperative diagnosis:  Complete tear of left rotator cuff [M75.122]    Postoperative diagnosis: Complete tear of left rotator cuff [P64.967    Surgeon(s) and Role:     Girish Zepeda MD - Primary     Assistant: none     Anesthesia: General, regional block    Antibiotics: Ancef 2 grams IV    Procedures:  Procedure(s):  LEFT SHOULDER ARTHROSCOPY WITH OPEN ROTATOR CUFF REPAIR, OPEN BICEPS TENODESIS  2  (LARGE)  44634  Subpectoral biceps tenodesis [id number]      Findings:  1. EUA -  Full PROM   2. Intra-articular - There was a large full thickness tear of the entire subscapularis, supraspinatus and anterior infraspinatus. The biceps tendon was intact but unstable and out of its groove. The cartilage appeared normal.   3. Subacromial - Large tear as above. The tissue was good. There was no spur noted. 4. AC joint - not symptomatic preoperatively    Indications / Consent: This is a patient who has persistent pain with some weakness in the left shoulder after an injury in which she also broke her left wrist.  They have not responded to conservative measures and were desirous of evaluation and possible arthroscopic or open repair of the rotator cuff. After previous discussions and treatments using both conservative and/or non-operative treatment options the patient elected to proceed with surgery due to continued symptoms. A review of the risks and benefits, including but not limited to infection, stiffness, injury to nerves and vessels, DVT, PE, MI, need for further operations and other anesthesia related risks was performed with the patient. After this review and the review of the likely outcome and potential complications of the procedure, preoperative verbal and written consents were obtained. The operative procedure and postoperative course were discussed with the patient in detail and the extremity was marked by the patient and myself.      Procedure: the patient was given an anesthetic, placed semi sitting, the head was held in a neutral position, the legs were flexed and pillows were placed under the legs. They were then padded and the operative shoulder was prepped with ChloraPrep and draped in the usual fashion. An EUA was performed and noted above. Prior to the beginning of the procedure, a time-out was performed for correct surgical site identification as was marked during the pre-operative meeting. This was confirmed using the written consent and history/physical. Time-out for antibiotic dosing, timing and selection was also performed. 10 cc of 1% lidocaine with epi was injected into the subacromial space. The operative arm was connected to an arm zapata and a standard posterior portal was made into the shoulder. On visualization of the shoulder after an anterior portal was developed, the biceps appeared normal at its anchor but it was out of its groove medially. The subscapularis appeared completely torn. The anterior labrum was intact. The axillary recess was normal.  The posterior labrum was intact. The articular surface appeared normal.  The scope was switched for a superior view and a full thickness tear of the rotator cuff was visualized. At this point the scope was pulled out of the posterior portal and placed subacromially. A lateral portal was developed. The size of the tear appeared to be approximately 5 cm. There was fraying on the coracoacromial ligament. The portals were then switched back and the nature of the cuff tear was determined. The edge of the cuff was debrided and mobilized. The greater tuberosity was lightly debrided with a shaver to prepare for tendon reattachment. After this had been done an auxiliary superior portal was developed and a bone punch was placed into the greater tuberosity in an appropriate position and tapped down into the bone. An anchor was placed just off the articular margin which was double loaded.  This was repeated so two medial anchors were placed. Using suture passing devices these sutures were passed through the cuff in horizontal mattress fashion. The lateral greater tuberosity was cleaned for lateral anchor placement. Half of the sutures from the medial anchors were pulled to the anterior lateral anchor placement. A hole was punched and the sutures placed on the anchor and the anchor advanced to secure the sutures. This process was repeated with the remaining sutures pulled to a more posterior lateral anchor in a similar fashion. This created a transosseous equivalent repair. After the sutures had been cut the cuff was probed and the shoulder was rotated to check for stability of the repair. It was felt that an adequate, tight repair had been able to be obtained. The scope was then removed from the shoulder. At this point a scalpel was used to make an incision anteriorly along the deltopectoral interval. Using blunt and sharp dissection with bovie cauterization the interval was further developed. The clavipectoral fascia was further identified. The subscapularis tendon tear was further identified along with the medially subluxated biceps tendon. The tendon excursion was verified and subsequently released as needed to obtain appropriate coverage of the lesser tuberosity. Using a triple loaded anchor, a medial row repair was started and tied using standard knot tying techniques. These sutures were then loaded into a knotless anchor and a transosseous type double row fixation was obtained. The biceps tendon was then secured under appropriate tension using a separate iconix anchor. The arm was taken through rotation and the elbow through flexion and extension. Both the subscapular tendon repair and the biceps tenodesis appeared very secure. The incision was irrigated and then closed in layered fashion. The cannulas were removed from the shoulder.   Interrupted monofilament sutures were placed on the portals. Allevyn dressings were placed in the shoulder. The patient was placed in a slight external rotation sling. They were returned to the recovery room in satisfactory condition. There were no known intraoperative complications. Post-operative plan: The patient was placed in an abduction sling and will remain in this for 4-6 weeks. Non weight bearing until otherwise instructed. Post operative instructions are provided. They will begin with instructed ROM exercises and PT (LARGE protocol) once scheduled through my office. I will see them back in approximately 10-12 days. Estimated Blood Loss:   15 ml    Fluids:  See anesthesia record    Implants:   Implant Name Type Inv.  Item Serial No.  Lot No. LRB No. Used Action   ANCHOR SUT ULTRA PEEK KL 5.5MM -- Jaylene Mullet - [id number] [place] ANCHOR SUT ULTRA PEEK KL 5.5MM -- Justin Kyrie [employer] [id number] Left 1 Implanted   ANCHOR SUT [place] 4.5 ULTRA --  - [id number] [place] ANCHOR SUT [place] 4.5 ULTRA --   Kenyetta Ravi [employer] [id number] Left 1 Implanted   ANCHOR SUT [place] 9.9FB --  - [id number] [place] ANCHOR SUT [place] 1.6SJ --   [place]yetta Ravi [employer] [id number] Left 1 Implanted   ANCHOR SUT PEEK SORB 2.4LU -- [place]AL[place] - [id number] [place] ANCHOR SUT PEEK SORB 1.0GV -- [place]AL[place]  [employer] [id number] Left 1 Implanted   ANCHOR SUT ULTRA PEEK KL 5.5MM -- Jaylene Mullet - [id number] [place] ANCHOR SUT ULTRA PEEK KL 5.5MM -- Justin Kyrie [employer] [id number] Left 1 Implanted   ANCHOR SUT ULTRA PEEK KL 5.5MM -- Jaylene Mullet - [id number] [place] ANCHOR SUT ULTRA PEEK KL 5.5MM -- Justin Kyrie [employer] [id number] Left 1 Implanted   ANCHOR SUT 2.3MM W/2.0MM BRAID --  - [id number] [place] ANCHOR SUT 2.3MM W/2.0MM BRAID --   [employer] [id number] Left 1 Implanted       Closure: Primary    Complications: None    Signed By: Amara Quiroz MD

## 2018-12-26 NOTE — BRIEF OP NOTE
BRIEF OPERATIVE NOTE    Date of Procedure: 12/26/2018   Preoperative Diagnosis: Complete tear of left rotator cuff [M75.122]  Postoperative Diagnosis: Complete tear of left rotator cuff [M75.122  Biceps instability   Procedure(s):  LEFT SHOULDER ARTHROSCOPY WITH OPEN ROTATOR CUFF REPAIR, OPEN BICEPS TENODESIS   Surgeon(s) and Role:     Mikel Augustin MD - Primary         Surgical Assistant: none    Surgical Staff:  Circ-1: Ambrocio Mcqueen RN  Scrub Tech-1: Brian Sanz  Scrub Tech-2: [facility]  Event Time In Time Out   Incision Start 1348    Incision Close 1511      Anesthesia: General   Estimated Blood Loss: 15 ml  Specimens: * No specimens in log *   Findings: Large tear of subscapularis, supraspinatus, and infraspinatus. Biceps instability. Complications: none  Implants:   Implant Name Type Inv.  Item Serial No.  Lot No. LRB No. Used Action   ANCHOR SUT ULTRA PEEK KL 5.5MM -- Praveen Porras - [id number] [place] ANCHOR SUT ULTRA PEEK KL 5.5MM -- Jessa Deja [employer] [id number] Left 1 Implanted   ANCHOR SUT [place] 4.5 ULTRA --  - [id number] [place] ANCHOR SUT [place]IL 4.5 ULTRA --   Enmanuel Chunchula [employer] [id number] Left 1 Implanted   ANCHOR SUT [place] 4.4XR --  - [id number] [place] ANCHOR SUT [place]AL[place]IL 9.0JQ --   Enmanuel Chunchula [employer] [id number] Left 1 Implanted   ANCHOR SUT PEEK SORB 7.9OQ -- HEAL[place]IL - [id number] [place] ANCHOR SUT PEEK SORB 8.8XP -- HEAL[employer]IL  [employer] [id number] Left 1 Implanted   ANCHOR SUT ULTRA PEEK KL 5.5MM -- Tyea Gokulters - [id number] [place] ANCHOR SUT ULTRA PEEK KL 5.5MM -- Sturgis Deja [employer] [id number] Left 1 Implanted   ANCHOR SUT ULTRA PEEK KL 5.5MM -- Tyea Gokulters - [id number] [place] ANCHOR SUT ULTRA PEEK KL 5.5MM -- Sturgis Deja [employer] [id number] Left 1 Implanted   ANCHOR SUT 2.3MM W/2.0MM BRAID --  - [id number] [place] ANCHOR SUT 2.3MM W/2.0MM BRAID --   [employer] [id number] Left 1 Implanted

## 2018-12-26 NOTE — ANESTHESIA PROCEDURE NOTES
Peripheral Block    Start time: 12/26/2018 11:43 AM  End time: 12/26/2018 11:49 AM  Performed by: Jaja Carbajal MD  Authorized by: Jaja Carbajal MD       Pre-procedure: Indications: at surgeon's request, post-op pain management and procedure for pain    Preanesthetic Checklist: patient identified, risks and benefits discussed, site marked, timeout performed, anesthesia consent given and patient being monitored    Timeout Time: 11:43          Block Type:   Block Type:   Interscalene  Laterality:  Left  Monitoring:  Standard ASA monitoring, responsive to questions, oxygen, continuous pulse ox, frequent vital sign checks and heart rate  Injection Technique:  Single shot  Procedures: ultrasound guided and nerve stimulator    Patient Position: seated  Prep: chlorhexidine    Location:  Interscalene  Needle Type:  Stimuplex  Needle Gauge:  22 G  Needle Localization:  Ultrasound guidance and nerve stimulator  Motor Response: minimal motor response >0.4 mA      Assessment:  Number of attempts:  1  Injection Assessment:  Incremental injection every 5 mL, no paresthesia, negative aspiration for CSF, local visualized surrounding nerve on ultrasound, negative aspiration for blood, no intravascular symptoms and ultrasound image on chart  Patient tolerance:  Patient tolerated the procedure well with no immediate complications

## 2018-12-26 NOTE — H&P
Outpatient Surgery History and Physical:  Pho[place] Dias was seen and examined. CHIEF COMPLAINT:   Left shoulder pain. 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    Chronic cough    Allergic rhinitis due to pollen    Chronic cystitis    GERD (gastroesophageal reflux disease)    Hypercholesteremia    Cerebellar stroke    Hypothyroidism    Anxiety    RLS (restless legs syndrome)    Stroke ([address]     Right cerebellar - residual balance problems and memory      Usual interstitial pneumonitis (HCC)       Surgical History:   Past Surgical History:   Procedure Laterality Date    HX BREAST AUGMENTATION Bilateral 02/2002    HX BREAST AUGMENTATION Bilateral 11/2016    revision     HX CARPAL TUNNEL RELEASE Bilateral     HX COLONOSCOPY  05/2015    normal - 5 year due to family  history    HX HEENT Left     graft eardrum x2     HX HEENT  Oct 2013    left ear repair - fracture bone    HX HEENT Left 5-21-15    ear reconstruction    HX HYSTERECTOMY  1986    HX MOHS PROCEDURES Right     w/bicep tendon repair    HX ORTHOPAEDIC Bilateral     trigger fingers X4    HX ORTHOPAEDIC  Sept 2012    right shoulder and left wrist    HX ORTHOPAEDIC Right April 2015    wrist tendon repair    HX ORTHOPAEDIC Left 11/2018    left wrist fracture    HX TONSILLECTOMY      IMPLANT BREAST SILICONE/EQ  5761       Social History: Patient  reports that she quit smoking about 42 years ago. She has a 13.50 pack-year smoking history. she has never used smokeless tobacco. She reports that she drinks alcohol. She reports that she does not use drugs.     Family History:   Family History   Problem Relation Age of Onset  Cancer Father     Heart Disease Mother        Allergies: Reviewed per EMR  Allergies   Allergen Reactions    Codeine Itching     Pt able to tolerate Codeine with benadryl       Medications:    No current facility-administered medications on file prior to encounter. Current Outpatient Medications on File Prior to Encounter   Medication Sig    acetaminophen (TYLENOL) 325 mg tablet Take 1,000 mg by mouth every four (4) hours as needed for Pain.  clonazePAM (KLONOPIN) 2 mg tablet Take 1 Tab by mouth three (3) times daily as needed (anxiety). Max Daily Amount: 6 mg.  oxyCODONE IR (ROXICODONE) 5 mg immediate release tablet Take 1 Tab by mouth every six (6) hours as needed for Pain. Max Daily Amount: 20 mg.    levothyroxine (SYNTHROID) 50 mcg tablet Take 1 Tab by mouth Daily (before breakfast).  bismuth subsalicylate (PEPTO-BISMOL MAXIMUM STRENGTH) 525 mg/15 mL susp Take 30 mL by mouth every six (6) hours as needed.  naproxen (NAPROSYN PO) Take 2 Tabs by mouth.  Cetirizine (ZYRTEC) 10 mg cap Take 10 mg by mouth two (2) times a day.  aspirin 81 mg tablet Take 81 mg by mouth two (2) times a week. The surgery is planned for the left shoulder for rotator cuff repair and possible biceps tenodesis. History and physical has been reviewed. The patient has been examined. There have been no significant clinical changes since the completion of the originally dated History and Physical.  Patient identified by surgeon; surgical site was confirmed by patient and surgeon. The patient is here today for outpatient surgery. I have examined the patient, no changes are noted in the patient's medical status. Necessity for the procedure/care is still present and the history and physical above is current. See the office notes for the full long term history of the problem. Please see the recent office notes for the musculoskeletal examination.     Signed By: Dustin Lopez MD     December 26, 2018 12:13 PM

## 2018-12-28 NOTE — ANESTHESIA POSTPROCEDURE EVALUATION
Procedure(s): LEFT SHOULDER ARTHROSCOPY WITH OPEN ROTATOR CUFF REPAIR, OPEN BICEPS TENODESIS . Anesthesia Post Evaluation Multimodal analgesia: multimodal analgesia used between 6 hours prior to anesthesia start to PACU discharge Patient location during evaluation: bedside Patient participation: complete - patient participated Level of consciousness: responsive to verbal stimuli Pain management: adequate Airway patency: patent Anesthetic complications: no 
Cardiovascular status: hemodynamically stable Respiratory status: spontaneous ventilation Hydration status: stable Visit Vitals /89 (BP 1 Location: Right arm, BP Patient Position: Sitting) Pulse (!) 17 Temp 36.2 °C (97.1 °F) Resp 14 Wt 61.2 kg (135 lb) SpO2 97% BMI 24.69 kg/m²

## 2022-03-19 PROBLEM — I69.398 ABNORMALITY OF GAIT AS LATE EFFECT OF STROKE: Status: ACTIVE | Noted: 2018-03-28

## 2022-03-19 PROBLEM — R26.9 ABNORMALITY OF GAIT AS LATE EFFECT OF STROKE: Status: ACTIVE | Noted: 2018-03-28

## 2025-02-05 ENCOUNTER — OFFICE VISIT (OUTPATIENT)
Dept: ORTHOPEDIC SURGERY | Age: 70
End: 2025-02-05
Payer: MEDICARE

## 2025-02-05 DIAGNOSIS — M25.552 LEFT HIP PAIN: Primary | ICD-10-CM

## 2025-02-05 DIAGNOSIS — M70.62 TROCHANTERIC BURSITIS OF LEFT HIP: ICD-10-CM

## 2025-02-05 PROCEDURE — 1123F ACP DISCUSS/DSCN MKR DOCD: CPT | Performed by: NURSE PRACTITIONER

## 2025-02-05 PROCEDURE — G8400 PT W/DXA NO RESULTS DOC: HCPCS | Performed by: NURSE PRACTITIONER

## 2025-02-05 PROCEDURE — 1090F PRES/ABSN URINE INCON ASSESS: CPT | Performed by: NURSE PRACTITIONER

## 2025-02-05 PROCEDURE — 99204 OFFICE O/P NEW MOD 45 MIN: CPT | Performed by: NURSE PRACTITIONER

## 2025-02-05 PROCEDURE — 3017F COLORECTAL CA SCREEN DOC REV: CPT | Performed by: NURSE PRACTITIONER

## 2025-02-05 PROCEDURE — 4004F PT TOBACCO SCREEN RCVD TLK: CPT | Performed by: NURSE PRACTITIONER

## 2025-02-05 PROCEDURE — G8428 CUR MEDS NOT DOCUMENT: HCPCS | Performed by: NURSE PRACTITIONER

## 2025-02-05 PROCEDURE — 20610 DRAIN/INJ JOINT/BURSA W/O US: CPT | Performed by: NURSE PRACTITIONER

## 2025-02-05 PROCEDURE — G8421 BMI NOT CALCULATED: HCPCS | Performed by: NURSE PRACTITIONER

## 2025-02-05 RX ORDER — METHYLPREDNISOLONE ACETATE 40 MG/ML
40 INJECTION, SUSPENSION INTRA-ARTICULAR; INTRALESIONAL; INTRAMUSCULAR; SOFT TISSUE ONCE
Status: COMPLETED | OUTPATIENT
Start: 2025-02-05 | End: 2025-02-05

## 2025-02-05 RX ADMIN — METHYLPREDNISOLONE ACETATE 40 MG: 40 INJECTION, SUSPENSION INTRA-ARTICULAR; INTRALESIONAL; INTRAMUSCULAR; SOFT TISSUE at 14:16

## 2025-02-05 NOTE — PROGRESS NOTES
Patient ID:    Wanda Harley  [id number]  69 y.o.  1955    Today: February 5, 2025      Chief Complaint: Left hip pain        Patient reports longstanding history of left hip pain.  The pain is predominately localized to the lateral hip and is characterized as a general ache with occasional sharp pain.  They rate the pain ranging from 3-8 on the pain scale occurring in a cyclical fashion with periods of acute exacerbation. Symptoms are exacerbated with attempting to sleep on the hip, attempting to ascend stairs, and sitting for long periods of time which results in lateral hip pain. Patient denies significant anterior groin pain and denies significant issues with attempting to put on socks and shoes or when attempting to get into or out of a vehicle. No numbness of tingling going down the extremity.  Patient has attempted prior conservative treatment including chiropractor and NSAIDs.      Past Medical History:  Past Medical History:   Diagnosis Date    Anxiety     managed well with PRN Klonopin    Autoimmune disease (HCC)     Basal cell carcinoma     to nose, scheduled to be removed 1/15/19    Claustrophobia     mild    GERD (gastroesophageal reflux disease)     occassioally, managed with PRN OTC meds    Headache 2007    Hypercholesteremia     pt denies    Hypertension 2007    Hypothyroidism     managed well with meds    Left wrist injury     Lupus ([facility])     signs of Lupus but no current meds    Rheumatoid arthritis ([facility])     RLS (restless legs syndrome)     Stroke ([facility]) 2007    right cerebellar - residual balance problems and memory    Usual interstitial pneumonitis ([facility])        Past Surgical History:  Past Surgical History:   Procedure Laterality Date    BREAST SURGERY Bilateral 11/2016    revision     BREAST SURGERY Bilateral 02/2002    CARPAL TUNNEL RELEASE Bilateral     COLONOSCOPY  05/2015    normal - 5 year due to family  history    HEENT Left 5-21-15    ear reconstruction    HEENT  Oct 2013

## 2025-05-28 ENCOUNTER — OFFICE VISIT (OUTPATIENT)
Dept: ORTHOPEDIC SURGERY | Age: 70
End: 2025-05-28
Payer: MEDICARE

## 2025-05-28 DIAGNOSIS — G89.29 CHRONIC LEFT SI JOINT PAIN: Primary | ICD-10-CM

## 2025-05-28 DIAGNOSIS — M53.3 CHRONIC LEFT SI JOINT PAIN: Primary | ICD-10-CM

## 2025-05-28 PROCEDURE — 1090F PRES/ABSN URINE INCON ASSESS: CPT | Performed by: NURSE PRACTITIONER

## 2025-05-28 PROCEDURE — 3017F COLORECTAL CA SCREEN DOC REV: CPT | Performed by: NURSE PRACTITIONER

## 2025-05-28 PROCEDURE — 1123F ACP DISCUSS/DSCN MKR DOCD: CPT | Performed by: NURSE PRACTITIONER

## 2025-05-28 PROCEDURE — 4004F PT TOBACCO SCREEN RCVD TLK: CPT | Performed by: NURSE PRACTITIONER

## 2025-05-28 PROCEDURE — 99214 OFFICE O/P EST MOD 30 MIN: CPT | Performed by: NURSE PRACTITIONER

## 2025-05-28 PROCEDURE — G8400 PT W/DXA NO RESULTS DOC: HCPCS | Performed by: NURSE PRACTITIONER

## 2025-05-28 PROCEDURE — G8428 CUR MEDS NOT DOCUMENT: HCPCS | Performed by: NURSE PRACTITIONER

## 2025-05-28 PROCEDURE — G8421 BMI NOT CALCULATED: HCPCS | Performed by: NURSE PRACTITIONER

## 2025-05-28 RX ORDER — CYCLOBENZAPRINE HCL 5 MG
5 TABLET ORAL 3 TIMES DAILY PRN
Qty: 30 TABLET | Refills: 0 | Status: SHIPPED | OUTPATIENT
Start: 2025-05-28 | End: 2025-06-07

## 2025-05-28 NOTE — PROGRESS NOTES
Patient ID:    Wanda Harley  [id number]  69 y.o.  1955    Today: May 28, 2025      Chief Complaint: Left hip pain        Patient reports longstanding history of left hip pain.  The pain is predominately localized to the posterior buttock and is characterized as a general ache with occasional sharp pain.  They rate the pain ranging from 3-8 on the pain scale occurring in a cyclical fashion with periods of acute exacerbation. Symptoms are exacerbated with sitting for a long period of time especially on hard surfaces. Patient denies significant anterior groin pain and denies significant issues with attempting to put on socks and shoes or when attempting to end into or out or a vehicle. No numbness of tingling going down the extremity.  Patient has attempted prior conservative treatment including Trochanteric Bursitis Injection.      Past Medical History:  Past Medical History:   Diagnosis Date    Anxiety     managed well with PRN Klonopin    Autoimmune disease     Basal cell carcinoma     to nose, scheduled to be removed 1/15/19    Claustrophobia     mild    GERD (gastroesophageal reflux disease)     occassioally, managed with PRN OTC meds    Headache 2007    Hypercholesteremia     pt denies    Hypertension 2007    Hypothyroidism     managed well with meds    Left wrist injury     Lupus (HCC)     signs of Lupus but no current meds    Rheumatoid arthritis (HCC)     RLS (restless legs syndrome)     Stroke ([facility]) 2007    right cerebellar - residual balance problems and memory    Usual interstitial pneumonitis (HCC)        Past Surgical History:  Past Surgical History:   Procedure Laterality Date    BREAST SURGERY Bilateral 11/2016    revision     BREAST SURGERY Bilateral 02/2002    CARPAL TUNNEL RELEASE Bilateral     COLONOSCOPY  05/2015    normal - 5 year due to family  history    HEENT Left 5-21-15    ear reconstruction    HEENT  Oct 2013    left ear repair - fracture bone    HEENT Left     graft eardrum

## 2025-06-25 ENCOUNTER — OFFICE VISIT (OUTPATIENT)
Dept: ORTHOPEDIC SURGERY | Age: 70
End: 2025-06-25
Payer: MEDICARE

## 2025-06-25 DIAGNOSIS — M53.3 CHRONIC LEFT SI JOINT PAIN: Primary | ICD-10-CM

## 2025-06-25 DIAGNOSIS — G89.29 CHRONIC LEFT SI JOINT PAIN: Primary | ICD-10-CM

## 2025-06-25 DIAGNOSIS — M25.50 MULTIPLE JOINT PAIN: ICD-10-CM

## 2025-06-25 PROCEDURE — 3017F COLORECTAL CA SCREEN DOC REV: CPT | Performed by: PHYSICAL MEDICINE & REHABILITATION

## 2025-06-25 PROCEDURE — 4004F PT TOBACCO SCREEN RCVD TLK: CPT | Performed by: PHYSICAL MEDICINE & REHABILITATION

## 2025-06-25 PROCEDURE — 1159F MED LIST DOCD IN RCRD: CPT | Performed by: PHYSICAL MEDICINE & REHABILITATION

## 2025-06-25 PROCEDURE — G8421 BMI NOT CALCULATED: HCPCS | Performed by: PHYSICAL MEDICINE & REHABILITATION

## 2025-06-25 PROCEDURE — 99204 OFFICE O/P NEW MOD 45 MIN: CPT | Performed by: PHYSICAL MEDICINE & REHABILITATION

## 2025-06-25 PROCEDURE — G8427 DOCREV CUR MEDS BY ELIG CLIN: HCPCS | Performed by: PHYSICAL MEDICINE & REHABILITATION

## 2025-06-25 PROCEDURE — 1160F RVW MEDS BY RX/DR IN RCRD: CPT | Performed by: PHYSICAL MEDICINE & REHABILITATION

## 2025-06-25 PROCEDURE — G8400 PT W/DXA NO RESULTS DOC: HCPCS | Performed by: PHYSICAL MEDICINE & REHABILITATION

## 2025-06-25 PROCEDURE — G2211 COMPLEX E/M VISIT ADD ON: HCPCS | Performed by: PHYSICAL MEDICINE & REHABILITATION

## 2025-06-25 PROCEDURE — 1090F PRES/ABSN URINE INCON ASSESS: CPT | Performed by: PHYSICAL MEDICINE & REHABILITATION

## 2025-06-25 PROCEDURE — 1123F ACP DISCUSS/DSCN MKR DOCD: CPT | Performed by: PHYSICAL MEDICINE & REHABILITATION

## 2025-06-25 NOTE — PROGRESS NOTES
[facility]  Consultation Note    Patient ID:  Name: Wanda Harley  MRN: [id number]  AGE: 69 y.o.  : 1955    Date of Consultation:  2025    CC:   Chief Complaint   Patient presents with    Back Pain         HPI:  Ms. Harley is a 69 old female who presents today for evaluation of left buttocks and SI joint pain.  Her brother is Cm Myers, who was also my patient but recently passed away.  She reports experiencing left hip pain for several decades, with a recent exacerbation in intensity. The pain is localized to the gluteal region and does not exhibit radicular characteristics. The severity of the pain is sufficient to impede her ability to perform activities such as shopping. The pain is alleviated by sitting but recurs upon standing. Additionally, she reports mild lumbar soreness. Despite undergoing chiropractic treatment two years ago, she did not achieve symptomatic relief. She has been managing the pain with Meloxicam (Mobic), which provides some nocturnal relief. However, she experienced a severe exacerbation of pain a few days ago that was refractory to Meloxicam. Her medical history is significant for arthritis, bilateral carpal tunnel release surgeries, and treatment for trigger finger. She has attempted to alleviate the pain by sleeping with a pillow between her knees, without success.    MEDICATIONS  Mobic    I reviewed x-rays of the pelvis and left hip from 2025.  There is no significant abnormality of the pelvis or left hip.     Past Medical History Includes:   Past Medical History:   Diagnosis Date    Anxiety     managed well with PRN Klonopin    Autoimmune disease     Basal cell carcinoma     to nose, scheduled to be removed 1/15/19    Claustrophobia     mild    GERD (gastroesophageal reflux disease)     occassioally, managed with PRN OTC meds    Headache     Hypercholesteremia     pt denies    Hypertension     Hypothyroidism

## 2025-06-26 ENCOUNTER — OFFICE VISIT (OUTPATIENT)
Dept: ORTHOPEDIC SURGERY | Age: 70
End: 2025-06-26
Payer: MEDICARE

## 2025-06-26 DIAGNOSIS — G89.29 CHRONIC LEFT SI JOINT PAIN: Primary | ICD-10-CM

## 2025-06-26 DIAGNOSIS — M53.3 CHRONIC LEFT SI JOINT PAIN: Primary | ICD-10-CM

## 2025-06-26 PROCEDURE — 27096 INJECT SACROILIAC JOINT: CPT | Performed by: PHYSICAL MEDICINE & REHABILITATION

## 2025-06-26 RX ORDER — TRIAMCINOLONE ACETONIDE 40 MG/ML
40 INJECTION, SUSPENSION INTRA-ARTICULAR; INTRAMUSCULAR ONCE
Status: COMPLETED | OUTPATIENT
Start: 2025-06-26 | End: 2025-06-26

## 2025-06-26 RX ADMIN — TRIAMCINOLONE ACETONIDE 40 MG: 40 INJECTION, SUSPENSION INTRA-ARTICULAR; INTRAMUSCULAR at 10:31

## 2025-07-09 ENCOUNTER — TELEPHONE (OUTPATIENT)
Dept: ORTHOPEDIC SURGERY | Age: 70
End: 2025-07-09

## 2025-07-09 DIAGNOSIS — M54.50 LOW BACK PAIN, UNSPECIFIED BACK PAIN LATERALITY, UNSPECIFIED CHRONICITY, UNSPECIFIED WHETHER SCIATICA PRESENT: Primary | ICD-10-CM

## 2025-07-09 NOTE — TELEPHONE ENCOUNTER
Patient states she needs to let Dr. John know that the last injection she received has not helped her pain at all.

## 2025-07-11 NOTE — TELEPHONE ENCOUNTER
Spoke with patient and she would like to proceed with the MRI of the Lumbar spine. Order was placed into patient's chart.

## 2025-07-21 ENCOUNTER — TELEPHONE (OUTPATIENT)
Dept: ORTHOPEDIC SURGERY | Age: 70
End: 2025-07-21

## 2025-07-21 NOTE — TELEPHONE ENCOUNTER
She sees her MRI results on my chart and wants to know what the next step is. Please let her know.

## 2025-07-30 NOTE — PROGRESS NOTES
[facility]  Consultation Note  Virtual/Telephone Visit     Patient ID:  Name: Wanda Harley  MRN: [id number]  AGE: 69 y.o.  : 1955    Date of Consultation:  2025    CC:   Chief Complaint   Patient presents with    Back Pain         HPI:  Ms. Harley presents today for follow-up of low back pain.    She reports experiencing acute pain localized to the left lumbar region, which intensified significantly yesterday afternoon. The pain diminishes with sitting or resting but recurs upon standing and ambulating on hard surfaces such as concrete or roads.  The pain radiates to the left hip.  The patient has been managing this pain for the past three years with chiropractic adjustments, which have not provided relief of this pain. She has also attempted to alleviate symptoms using [employer] tennis shoes without success. She is amenable to trying Naprosyn and is considering an injection if deemed necessary. Previous treatment with prednisone, prescribed by Dr. Dobson, did not alleviate her symptoms. She has been prescribed cyclobenzaprine, which she takes in conjunction with three Naprosyn tablets, providing some relief. However, she acknowledges that she should not be taking Naprosyn regularly since she has been using meloxicam. Additionally, she utilizes Voltaren gel for topical analgesia.  She underwent left sacroiliac joint injection 2025.  This did not help.  She rates pain as 8/10 in severity.  The pain severely affects her function.  She has performed physician guided home exercises daily since 2025 and continuing currently.  This has not helped.  The pain has been present for a few years.    The patient has a history of bilateral shoulder surgery secondary to weightlifting. She occasionally experiences soreness in her right arm upon elevation, though this is not consistent. She avoids lying on the affected side. Occasionally, she experiences sharp pain in

## 2025-08-01 ENCOUNTER — TELEMEDICINE (OUTPATIENT)
Dept: ORTHOPEDIC SURGERY | Age: 70
End: 2025-08-01

## 2025-08-01 DIAGNOSIS — M47.816 LUMBAR SPONDYLOSIS: Primary | ICD-10-CM

## 2025-08-01 DIAGNOSIS — M54.16 LUMBAR RADICULOPATHY: ICD-10-CM

## 2025-08-01 DIAGNOSIS — M51.360 DEGENERATION OF INTERVERTEBRAL DISC OF LUMBAR REGION WITH DISCOGENIC BACK PAIN: ICD-10-CM

## 2025-08-01 DIAGNOSIS — M51.26 DISC DISPLACEMENT, LUMBAR: ICD-10-CM

## 2025-08-01 RX ORDER — NAPROXEN 500 MG/1
500 TABLET ORAL 2 TIMES DAILY WITH MEALS
Qty: 60 TABLET | Refills: 1 | Status: SHIPPED | OUTPATIENT
Start: 2025-08-01

## 2025-08-13 ENCOUNTER — OFFICE VISIT (OUTPATIENT)
Dept: ORTHOPEDIC SURGERY | Age: 70
End: 2025-08-13
Payer: MEDICARE

## 2025-08-13 DIAGNOSIS — M54.16 LUMBAR RADICULOPATHY: ICD-10-CM

## 2025-08-13 DIAGNOSIS — M47.816 LUMBAR SPONDYLOSIS: Primary | ICD-10-CM

## 2025-08-13 DIAGNOSIS — M51.360 DEGENERATION OF INTERVERTEBRAL DISC OF LUMBAR REGION WITH DISCOGENIC BACK PAIN: ICD-10-CM

## 2025-08-13 PROCEDURE — 64484 NJX AA&/STRD TFRM EPI L/S EA: CPT | Performed by: PHYSICAL MEDICINE & REHABILITATION

## 2025-08-13 PROCEDURE — 64483 NJX AA&/STRD TFRM EPI L/S 1: CPT | Performed by: PHYSICAL MEDICINE & REHABILITATION

## 2025-08-13 RX ORDER — TRIAMCINOLONE ACETONIDE 40 MG/ML
40 INJECTION, SUSPENSION INTRA-ARTICULAR; INTRAMUSCULAR ONCE
Status: COMPLETED | OUTPATIENT
Start: 2025-08-13 | End: 2025-08-13

## 2025-08-13 RX ADMIN — TRIAMCINOLONE ACETONIDE 40 MG: 40 INJECTION, SUSPENSION INTRA-ARTICULAR; INTRAMUSCULAR at 09:03

## 2025-08-25 ENCOUNTER — PATIENT MESSAGE (OUTPATIENT)
Dept: ORTHOPEDIC SURGERY | Age: 70
End: 2025-08-25

## 2025-08-25 DIAGNOSIS — M54.16 LUMBAR RADICULOPATHY: ICD-10-CM

## 2025-08-25 DIAGNOSIS — M51.360 DEGENERATION OF INTERVERTEBRAL DISC OF LUMBAR REGION WITH DISCOGENIC BACK PAIN: ICD-10-CM

## 2025-08-25 DIAGNOSIS — M47.816 LUMBAR SPONDYLOSIS: Primary | ICD-10-CM

## (undated) DEVICE — 3M™ IOBAN™ 2 ANTIMICROBIAL INCISE DRAPE 6650EZ: Brand: IOBAN™ 2

## (undated) DEVICE — BLADE SHV DIA4MM RED RESECT FOR GEN DEB REM OF PERIOST FR

## (undated) DEVICE — 90-S ACCELERATOR, SUCTION PROBE, NON-BENDABLE, MAX CUT LEVEL 11: Brand: SERFAS ENERGY

## (undated) DEVICE — STERILE HOOK LOCK LATEX FREE ELASTIC BANDAGE 3INX5YD: Brand: HOOK LOCK™

## (undated) DEVICE — (D)PREP SKN CHLRAPRP APPL 26ML -- CONVERT TO ITEM 371833

## (undated) DEVICE — DRAPE, FILM SHEET, 44X65 STERILE: Brand: MEDLINE

## (undated) DEVICE — TRUEPASS DISPOSABLE NEEDLES 5 PER BOX: Brand: TRUEPASS

## (undated) DEVICE — MEDI-VAC YANKAUER SUCTION HANDLE W/BULBOUS TIP: Brand: CARDINAL HEALTH

## (undated) DEVICE — SURGICAL PROCEDURE PACK BASIC ST FRANCIS

## (undated) DEVICE — SPLINT THMB W3XL12IN FBRGLS PD PRECUT LTWT DURABLE FAST SET

## (undated) DEVICE — OUTFLOW CASSETTE TUBING, DO NOT USE IF PACKAGE IS DAMAGED: Brand: CROSSFLOW

## (undated) DEVICE — BANDAGE COMPR SELF ADH 5 YDX6 IN TAN STRL PREMIERPRO LF

## (undated) DEVICE — NEEDLE HYPO 18GA L1.5IN PNK S STL HUB POLYPR SHLD REG BVL

## (undated) DEVICE — SHEET, DRAPE, SPLIT, STERILE: Brand: MEDLINE

## (undated) DEVICE — BUR SHV CUT HLLW 6 FLUT 5.5MM --

## (undated) DEVICE — SHOULDER STABILIZATION KIT,                                    DISPOSABLE 12 PER BOX

## (undated) DEVICE — SOLUTION IRRIG 3000ML 0.9% SOD CHL FLX CONT 0797208] ICU MEDICAL INC]

## (undated) DEVICE — DRAPE XR C ARM 41X74IN LF --

## (undated) DEVICE — 1010 S-DRAPE TOWEL DRAPE 10/BX: Brand: STERI-DRAPE™

## (undated) DEVICE — INFLOW CASSETTE TUBING, DO NOT USE IF PACKAGE IS DAMAGED: Brand: CROSSFLOW

## (undated) DEVICE — SWITCH DRAPE TENET 7633

## (undated) DEVICE — AMD ANTIMICROBIAL GAUZE SPONGES,12 PLY USP TYPE VII, 0.2% POLYHEXAMETHYLENE BIGUANIDE HCI (PHMB): Brand: CURITY

## (undated) DEVICE — SOFT SILICONE HYDROCELLULAR FOAM DRESSING WITH LOCK AWAY LAYER: Brand: ALLEVYN LIFE XL 21X21 CTN10

## (undated) DEVICE — ULTRABRAID NO.2 WHITE SUTURE AND                                    NEEDLE ASSEMBLY, 38 INCH, 10 PER                                    BOX, STERILE: Brand: ULTRABRAID

## (undated) DEVICE — APPLICATOR BNDG 1MM ADH PREMIERPRO EXOFIN

## (undated) DEVICE — SUTURE MCRYL SZ 3-0 L27IN ABSRB UD L19MM PS-2 3/8 CIR PRIM Y427H

## (undated) DEVICE — 2.0MM DRILL BIT/QC/100MM

## (undated) DEVICE — KENDALL SCD EXPRESS SLEEVES, KNEE LENGTH, MEDIUM: Brand: KENDALL SCD

## (undated) DEVICE — ZIMMER® STERILE DISPOSABLE TOURNIQUET CUFF WITH PLC, DUAL PORT, SINGLE BLADDER, 18 IN. (46 CM)

## (undated) DEVICE — SUT ETHLN 3-0 18IN PS2 BLK --

## (undated) DEVICE — ULTRABRAID 2 COBRAID 38 LENGTH SINGL: Brand: ULTRABRAID

## (undated) DEVICE — DRAPE,HAND,STERILE: Brand: MEDLINE

## (undated) DEVICE — 4-PORT MANIFOLD: Brand: NEPTUNE 2

## (undated) DEVICE — PADDING CAST W2INXL4YD ST COT COHESIVE HND TEARABLE SPEC

## (undated) DEVICE — DRAPE,SHOULDER,BEACH CHAIR,STERILE: Brand: MEDLINE

## (undated) DEVICE — PADDING CAST COHESIVE 4 YDX3 IN HND TEARABLE COTTON SPEC 100

## (undated) DEVICE — STOCKINETTE,IMPERVIOUS,12X48,STERILE: Brand: MEDLINE

## (undated) DEVICE — NEEDLE SUT PASS FLX DISP RP360

## (undated) DEVICE — REM POLYHESIVE ADULT PATIENT RETURN ELECTRODE: Brand: VALLEYLAB

## (undated) DEVICE — SUTURE NONABSORBABLE MONOFILAMENT 4-0 PS-2 18 IN BLU PROLENE 8682H

## (undated) DEVICE — K WIRE FIX L150MM DIA1.25MM S STL TRCR PNT
Type: IMPLANTABLE DEVICE | Site: WRIST | Status: NON-FUNCTIONAL
Removed: 2018-12-03

## (undated) DEVICE — T-MAX DISPOSABLE FACE MASK 8 PER BOX

## (undated) DEVICE — STERILE HOOK LOCK LATEX FREE ELASTIC BANDAGE 2INX5YD: Brand: HOOK LOCK™

## (undated) DEVICE — SUTURE PDS II SZ 0 L27IN ABSRB VLT L26MM CT-2 1/2 CIR Z334H

## (undated) DEVICE — [THREADED CANNULA.  DO NOT RESTERILIZE,  DO NOT USE IF PACKAGE IS DAMAGED]: Brand: DRI-LOK

## (undated) DEVICE — SUTURE ETHBND EXCEL SZ 2 L27IN NONABSORBABLE GRN WHT MO-7 D7485

## (undated) DEVICE — DISPOSABLE BIPOLAR CODE, 12' (3.66 M): Brand: CONMED

## (undated) DEVICE — WATERPROOF, BACTERIA PROOF DRESSING WITH ABSORBENT SEE THROUGH PAD: Brand: OPSITE POST-OP VISIBLE 15X10CM CTN 20

## (undated) DEVICE — DRAPE,U/SHT,SPLIT,FILM,60X84,STERILE: Brand: MEDLINE

## (undated) DEVICE — DRAPE SHT 3 QTR PROXIMA 53X77 --

## (undated) DEVICE — MEDI-VAC NON-CONDUCTIVE SUCTION TUBING: Brand: CARDINAL HEALTH

## (undated) DEVICE — BANDAGE COMPR 9 FTX4 IN SMOOTH COMFORTABLE SYNTH ESMRK LF

## (undated) DEVICE — 1.8MM DRILL BIT WITH DEPTH MARK/QC/110MM

## (undated) DEVICE — NDL SPNE QNCKE 18GX3.5IN LF --

## (undated) DEVICE — SUTURE STRATAFIX SZ 3-0 L14CM NONABSORBABLE UD L19MM FS-2 SXMP2B406

## (undated) DEVICE — SYRINGE EAR 2OZ ULC SLIMMER TIP FLAT BTM SUCT PWR DISP FOR

## (undated) DEVICE — SUTURE MCRYL SZ 2-0 L27IN ABSRB UD CP-1 1 L36MM 1/2 CIR REV Y266H

## (undated) DEVICE — SOLUTION IV 1000ML 0.9% SOD CHL